# Patient Record
Sex: FEMALE | Race: ASIAN | NOT HISPANIC OR LATINO
[De-identification: names, ages, dates, MRNs, and addresses within clinical notes are randomized per-mention and may not be internally consistent; named-entity substitution may affect disease eponyms.]

---

## 2018-02-19 ENCOUNTER — FORM ENCOUNTER (OUTPATIENT)
Age: 68
End: 2018-02-19

## 2018-06-01 ENCOUNTER — FORM ENCOUNTER (OUTPATIENT)
Age: 68
End: 2018-06-01

## 2020-05-12 ENCOUNTER — FORM ENCOUNTER (OUTPATIENT)
Age: 70
End: 2020-05-12

## 2021-03-12 ENCOUNTER — APPOINTMENT (OUTPATIENT)
Dept: HEMATOLOGY ONCOLOGY | Facility: CLINIC | Age: 71
End: 2021-03-12

## 2021-03-12 PROBLEM — Z00.00 ENCOUNTER FOR PREVENTIVE HEALTH EXAMINATION: Status: ACTIVE | Noted: 2021-03-12

## 2021-04-05 ENCOUNTER — NON-APPOINTMENT (OUTPATIENT)
Age: 71
End: 2021-04-05

## 2021-04-05 ENCOUNTER — APPOINTMENT (OUTPATIENT)
Dept: CV DIAGNOSITCS | Facility: HOSPITAL | Age: 71
End: 2021-04-05

## 2021-04-05 ENCOUNTER — OUTPATIENT (OUTPATIENT)
Dept: OUTPATIENT SERVICES | Facility: HOSPITAL | Age: 71
LOS: 1 days | End: 2021-04-05
Payer: MEDICARE

## 2021-04-05 ENCOUNTER — APPOINTMENT (OUTPATIENT)
Dept: CARDIOLOGY | Facility: CLINIC | Age: 71
End: 2021-04-05
Payer: MEDICARE

## 2021-04-05 VITALS
HEIGHT: 59 IN | DIASTOLIC BLOOD PRESSURE: 66 MMHG | SYSTOLIC BLOOD PRESSURE: 144 MMHG | HEART RATE: 60 BPM | OXYGEN SATURATION: 97 % | RESPIRATION RATE: 16 BRPM | TEMPERATURE: 97 F | BODY MASS INDEX: 31.11 KG/M2 | WEIGHT: 154.32 LBS

## 2021-04-05 DIAGNOSIS — R94.31 ABNORMAL ELECTROCARDIOGRAM [ECG] [EKG]: ICD-10-CM

## 2021-04-05 PROCEDURE — 93306 TTE W/DOPPLER COMPLETE: CPT | Mod: 26

## 2021-04-05 PROCEDURE — 93000 ELECTROCARDIOGRAM COMPLETE: CPT

## 2021-04-05 PROCEDURE — 99204 OFFICE O/P NEW MOD 45 MIN: CPT

## 2021-04-05 RX ORDER — FOLIC ACID 1 MG/1
1 TABLET ORAL
Qty: 90 | Refills: 0 | Status: ACTIVE | COMMUNITY
Start: 2020-04-08

## 2021-04-05 RX ORDER — GABAPENTIN 100 MG/1
100 CAPSULE ORAL
Qty: 90 | Refills: 0 | Status: ACTIVE | COMMUNITY
Start: 2020-12-23

## 2021-04-05 RX ORDER — CALCIUM ACETATE 667 MG/1
667 TABLET ORAL
Qty: 270 | Refills: 0 | Status: ACTIVE | COMMUNITY
Start: 2020-07-17

## 2021-04-05 NOTE — REASON FOR VISIT
[Initial Evaluation] : an initial evaluation of [Spouse] : spouse [FreeTextEntry1] : carcinoid syndrome

## 2021-04-05 NOTE — PHYSICAL EXAM
[Well Groomed] : well groomed [General Appearance - Well Nourished] : well nourished [General Appearance - In No Acute Distress] : no acute distress [Normal Conjunctiva] : the conjunctiva exhibited no abnormalities [No Oral Pallor] : no oral pallor [No Oral Cyanosis] : no oral cyanosis [Normal Jugular Venous V Waves Present] : normal jugular venous V waves present [Heart Rate And Rhythm] : heart rate and rhythm were normal [Heart Sounds] : normal S1 and S2 [Murmurs] : no murmurs present [Arterial Pulses Normal] : the arterial pulses were normal [Edema] : no peripheral edema present [Respiration, Rhythm And Depth] : normal respiratory rhythm and effort [Exaggerated Use Of Accessory Muscles For Inspiration] : no accessory muscle use [Auscultation Breath Sounds / Voice Sounds] : lungs were clear to auscultation bilaterally [Abdomen Soft] : soft [Abdomen Tenderness] : non-tender [Nail Clubbing] : no clubbing of the fingernails [Cyanosis, Localized] : no localized cyanosis [Skin Turgor] : normal skin turgor [] : no rash [Mood] : the mood was normal [No Anxiety] : not feeling anxious [FreeTextEntry1] : seated in wheelchair

## 2021-04-05 NOTE — HISTORY OF PRESENT ILLNESS
[FreeTextEntry1] : 71 year old woman with pulmonary carcinoid tumor s/p wedge resection, ESRD on HD via LUE AV fistula, CVA with residual left hemiparesis, hypertension, syncope s/p dual chamber pacemaker, and insulin dependent diabetes who is wheelchair dependent at baseline referred for cardiovascular evaluation because of carcinoid syndrome. She reports dyspnea and more labored breathing prior to hemodialysis which is stable and unchanged for several years. She denies any chest pain, palpitations, abdominal distension, lower extremity edema, dizziness/lightheadedness. Carcinoid symptoms are under good control on monthly somatostatin therapy.\par \par She is not aware of any history of cardiovascular disease and reports a cardiac catheterization done at [facility] in 11/2019 showed no obstructive disease. Her last echocardiogram in 2020 showed normal LV systolic function; tricuspid valve was not well visualized on that study. Most recent chromogranin A  165 (stable).\par \par   \par Current Medications:\par clopidogrel\par amlodipine\par hydralazine 100 mg TID\par carvedilol 6.25 BID\par calcium acetate\par atorvastatin 20\par insulin\par \par EKG 4/5/2021: atrial pacemaker, RBBB.\par

## 2021-04-05 NOTE — ASSESSMENT
[FreeTextEntry1] : In summary, this is a 71 year old woman with history of stroke with residual hemiparesis, ESRD on HD with an upper extremity fistula, permanent pacemaker for syncope, and CAD risk factors seen for evaluation and screening for carcinoid heart disease. Cardiac symptom status is difficult to assess, but appears stable. Carcinoid syndrome appears well controlled. She has no evidence of significant valvulopathy or right sided heart failure.\par \par Blood pressure is elevated on assessment today, but she is due for dialysis tomorrow so this may reflect systemic volume status. She is on medical therapy for hypertension.\par \par Her pacemaker is followed by her cardiologist at [facility].\par \par A transthoracic echocardiogram will be obtained today to evaluate the heart valves and function. Cardiac MRI is preferred modality, but will be challenging in the setting of chronic renal failure and permanent pacemaker. If visualization is suboptimal on transthoracic images, a transesophageal echocardiogram may be considered. I discussed this with the patient today. However, in the absence of suspicious abnormalities on echo or new symptoms, we will continue ongoing follow up with annual transthoracic echocardiogram.\par \par

## 2021-04-09 ENCOUNTER — NON-APPOINTMENT (OUTPATIENT)
Age: 71
End: 2021-04-09

## 2022-04-12 ENCOUNTER — FORM ENCOUNTER (OUTPATIENT)
Age: 72
End: 2022-04-12

## 2022-04-25 ENCOUNTER — APPOINTMENT (OUTPATIENT)
Dept: CARDIOLOGY | Facility: CLINIC | Age: 72
End: 2022-04-25
Payer: MEDICARE

## 2022-04-25 ENCOUNTER — RESULT REVIEW (OUTPATIENT)
Age: 72
End: 2022-04-25

## 2022-04-25 ENCOUNTER — OUTPATIENT (OUTPATIENT)
Dept: OUTPATIENT SERVICES | Facility: HOSPITAL | Age: 72
LOS: 1 days | Discharge: HOME | End: 2022-04-25
Payer: MEDICARE

## 2022-04-25 VITALS
TEMPERATURE: 96.8 F | DIASTOLIC BLOOD PRESSURE: 68 MMHG | OXYGEN SATURATION: 99 % | HEART RATE: 66 BPM | HEIGHT: 59 IN | RESPIRATION RATE: 16 BRPM | WEIGHT: 154.65 LBS | SYSTOLIC BLOOD PRESSURE: 200 MMHG | BODY MASS INDEX: 31.18 KG/M2

## 2022-04-25 DIAGNOSIS — D64.9 ANEMIA, UNSPECIFIED: ICD-10-CM

## 2022-04-25 LAB
BASOPHILS # BLD AUTO: 0.04 K/UL — SIGNIFICANT CHANGE UP (ref 0–0.2)
BASOPHILS NFR BLD AUTO: 0.6 % — SIGNIFICANT CHANGE UP (ref 0–2)
EOSINOPHIL # BLD AUTO: 0.29 K/UL — SIGNIFICANT CHANGE UP (ref 0–0.5)
EOSINOPHIL NFR BLD AUTO: 4.7 % — SIGNIFICANT CHANGE UP (ref 0–6)
HCT VFR BLD CALC: 35 % — SIGNIFICANT CHANGE UP (ref 34.5–45)
HGB BLD-MCNC: 10.9 G/DL — LOW (ref 11.5–15.5)
IMM GRANULOCYTES NFR BLD AUTO: 0.6 % — SIGNIFICANT CHANGE UP (ref 0–1.5)
LYMPHOCYTES # BLD AUTO: 1.14 K/UL — SIGNIFICANT CHANGE UP (ref 1–3.3)
LYMPHOCYTES # BLD AUTO: 18.5 % — SIGNIFICANT CHANGE UP (ref 13–44)
MCHC RBC-ENTMCNC: 28.3 PG — SIGNIFICANT CHANGE UP (ref 27–34)
MCHC RBC-ENTMCNC: 31.1 G/DL — LOW (ref 32–36)
MCV RBC AUTO: 90.9 FL — SIGNIFICANT CHANGE UP (ref 80–100)
MONOCYTES # BLD AUTO: 0.38 K/UL — SIGNIFICANT CHANGE UP (ref 0–0.9)
MONOCYTES NFR BLD AUTO: 6.2 % — SIGNIFICANT CHANGE UP (ref 2–14)
NEUTROPHILS # BLD AUTO: 4.27 K/UL — SIGNIFICANT CHANGE UP (ref 1.8–7.4)
NEUTROPHILS NFR BLD AUTO: 69.4 % — SIGNIFICANT CHANGE UP (ref 43–77)
NRBC # BLD: 0 /100 WBCS — SIGNIFICANT CHANGE UP (ref 0–0)
PLATELET # BLD AUTO: 121 K/UL — LOW (ref 150–400)
RBC # BLD: 3.85 M/UL — SIGNIFICANT CHANGE UP (ref 3.8–5.2)
RBC # FLD: 14.3 % — SIGNIFICANT CHANGE UP (ref 10.3–14.5)
WBC # BLD: 6.16 K/UL — SIGNIFICANT CHANGE UP (ref 3.8–10.5)
WBC # FLD AUTO: 6.16 K/UL — SIGNIFICANT CHANGE UP (ref 3.8–10.5)

## 2022-04-25 PROCEDURE — 93000 ELECTROCARDIOGRAM COMPLETE: CPT

## 2022-04-25 PROCEDURE — 93010 ELECTROCARDIOGRAM REPORT: CPT

## 2022-04-25 PROCEDURE — 99215 OFFICE O/P EST HI 40 MIN: CPT

## 2022-04-25 NOTE — CARDIOLOGY SUMMARY
[de-identified] : 4/25/2022: NSR 64 bpm 1st degree AV block, RBBB\par 4/5/2021: atrial pacemaker, RBBB. [de-identified] : 4/5/2021: LVEF 66%, GLS -15.5, normal tricuspid valve, normal pulmonic valve, moderate pulmonary HTN

## 2022-04-25 NOTE — PHYSICAL EXAM
[Well Developed] : well developed [Well Nourished] : well nourished [No Acute Distress] : no acute distress [Normal Conjunctiva] : normal conjunctiva [No Xanthelasma] : no xanthelasma [No Carotid Bruit] : no carotid bruit [Normal S1, S2] : normal S1, S2 [No Murmur] : no murmur [No Rub] : no rub [No Gallop] : no gallop [Clear Lung Fields] : clear lung fields [Good Air Entry] : good air entry [No Respiratory Distress] : no respiratory distress  [Soft] : abdomen soft [Abnormal Gait] : abnormal gait [No Cyanosis] : no cyanosis [No Clubbing] : no clubbing [No Varicosities] : no varicosities [No Rash] : no rash [Normal Speech] : normal speech [Alert and Oriented] : alert and oriented [de-identified] : trace pedal edema [de-identified] : left hemiparesis

## 2022-04-25 NOTE — ASSESSMENT
[FreeTextEntry1] : 72 year old woman with history of stroke with residual hemiparesis, ESRD on HD with an upper extremity fistula, permanent pacemaker for syncope, and CAD risk factors seen for evaluation and screening for carcinoid heart disease. Carcinoid syndrome appears well controlled. She has no evidence of significant valvulopathy or right sided heart failure.\par \par Blood pressure is quite elevated on assessment today, but she is due for dialysis tomorrow so this may reflect systemic volume status. She is on medical therapy for hypertension.\par - advised to take hydralazine TID as directed, especially on non-HD days\par - will discuss whether additional fluid removal during dialysis may be helpful for shortness of breath\par \par Her pacemaker is followed by her cardiologist at [facility].\par \par Shortness of breath: discussed whether additional fluid removal with HD may be helpful. Due to back pain, she is sometimes unable to tolerate full session.\par - nuclear stress test given risk factors\par - transthoracic echo\par \par Neuroendocrine tumor: lung involvement but no significant hepatic disease\par - will repeat transthoracic echo\par - mild aortic valve thickening noted previously, will repeat and consider TRACY pending ongoing dyspnea after ischemic eval and optimization of BP and HD\par - chromogranin A, BNP, HsTnt today\par \par Follow up in 3 months with me\par \par Total time spent on today's encounter was 45 minutes. >50% time spent in counseling and coordination of care and on addressing above medical conditions in assessment.\par \par \par  \par \par

## 2022-04-25 NOTE — REVIEW OF SYSTEMS
[SOB] : shortness of breath [Limb Weakness (Paresis)] : limb weakness (Paresis) [Negative] : Heme/Lymph

## 2022-04-25 NOTE — HISTORY OF PRESENT ILLNESS
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 No chest pain, pressure, or palpitations. Remains on monthly octreotide with stable disease. No carcinoid symptoms.\par \par \par \par History: \par She reports dyspnea and more labored breathing prior to hemodialysis which is stable and unchanged for several years. She denies any chest pain, palpitations, abdominal distension, lower extremity edema, dizziness/lightheadedness. Carcinoid symptoms are under good control on monthly somatostatin therapy.\par \par She is not aware of any history of cardiovascular disease and reports a cardiac catheterization done at [facility] in 11/2019 showed no obstructive disease. Her last echocardiogram in 2020 showed normal LV systolic function; tricuspid valve was not well visualized on that study. Most recent chromogranin A  165 (stable).\par \par   \par Current Medications:\par clopidogrel\par amlodipine\par hydralazine 100 mg TID\par carvedilol 6.25 BID\par calcium acetate\par atorvastatin 20\par insulin\par \par \par

## 2022-04-26 LAB
CHOLEST SERPL-MCNC: 122 MG/DL
ESTIMATED AVERAGE GLUCOSE: 189 MG/DL
HBA1C MFR BLD HPLC: 8.2 %
HDLC SERPL-MCNC: 36 MG/DL
LDLC SERPL CALC-MCNC: 50 MG/DL
NONHDLC SERPL-MCNC: 85 MG/DL
NT-PROBNP SERPL-MCNC: 2364 PG/ML
TRIGL SERPL-MCNC: 177 MG/DL
TROPONIN-T, HIGH SENSITIVITY: 83 NG/L

## 2022-04-29 LAB — CGA SERPL-MCNC: 94.1 NG/ML

## 2022-05-02 ENCOUNTER — APPOINTMENT (OUTPATIENT)
Dept: CV DIAGNOSTICS | Facility: HOSPITAL | Age: 72
End: 2022-05-02
Payer: MEDICARE

## 2022-05-02 ENCOUNTER — OUTPATIENT (OUTPATIENT)
Dept: OUTPATIENT SERVICES | Facility: HOSPITAL | Age: 72
LOS: 1 days | End: 2022-05-02

## 2022-05-02 DIAGNOSIS — N18.6 END STAGE RENAL DISEASE: ICD-10-CM

## 2022-05-02 DIAGNOSIS — R06.02 SHORTNESS OF BREATH: ICD-10-CM

## 2022-05-02 DIAGNOSIS — I10 ESSENTIAL (PRIMARY) HYPERTENSION: ICD-10-CM

## 2022-05-02 PROCEDURE — 78452 HT MUSCLE IMAGE SPECT MULT: CPT | Mod: 26,MH

## 2022-05-02 PROCEDURE — 93018 CV STRESS TEST I&R ONLY: CPT | Mod: GC

## 2022-05-02 PROCEDURE — 93016 CV STRESS TEST SUPVJ ONLY: CPT | Mod: GC

## 2022-05-04 ENCOUNTER — APPOINTMENT (OUTPATIENT)
Dept: CV DIAGNOSITCS | Facility: HOSPITAL | Age: 72
End: 2022-05-04

## 2022-05-04 ENCOUNTER — OUTPATIENT (OUTPATIENT)
Dept: OUTPATIENT SERVICES | Facility: HOSPITAL | Age: 72
LOS: 1 days | End: 2022-05-04
Payer: MEDICARE

## 2022-05-04 DIAGNOSIS — C7A.090 MALIGNANT CARCINOID TUMOR OF THE BRONCHUS AND LUNG: ICD-10-CM

## 2022-05-04 DIAGNOSIS — R06.02 SHORTNESS OF BREATH: ICD-10-CM

## 2022-05-04 PROCEDURE — 93306 TTE W/DOPPLER COMPLETE: CPT | Mod: 26

## 2022-05-09 ENCOUNTER — NON-APPOINTMENT (OUTPATIENT)
Age: 72
End: 2022-05-09

## 2022-05-09 ENCOUNTER — OUTPATIENT (OUTPATIENT)
Dept: OUTPATIENT SERVICES | Facility: HOSPITAL | Age: 72
LOS: 1 days | End: 2022-05-09
Payer: MEDICARE

## 2022-05-09 DIAGNOSIS — Z11.52 ENCOUNTER FOR SCREENING FOR COVID-19: ICD-10-CM

## 2022-05-09 LAB — SARS-COV-2 RNA SPEC QL NAA+PROBE: SIGNIFICANT CHANGE UP

## 2022-05-09 PROCEDURE — U0003: CPT

## 2022-05-09 PROCEDURE — U0005: CPT

## 2022-05-09 PROCEDURE — C9803: CPT

## 2022-05-11 ENCOUNTER — OUTPATIENT (OUTPATIENT)
Dept: OUTPATIENT SERVICES | Facility: HOSPITAL | Age: 72
LOS: 1 days | End: 2022-05-11
Payer: MEDICARE

## 2022-05-11 ENCOUNTER — TRANSCRIPTION ENCOUNTER (OUTPATIENT)
Age: 72
End: 2022-05-11

## 2022-05-11 VITALS
RESPIRATION RATE: 17 BRPM | OXYGEN SATURATION: 97 % | HEART RATE: 77 BPM | SYSTOLIC BLOOD PRESSURE: 162 MMHG | DIASTOLIC BLOOD PRESSURE: 75 MMHG

## 2022-05-11 VITALS
WEIGHT: 154.32 LBS | DIASTOLIC BLOOD PRESSURE: 87 MMHG | RESPIRATION RATE: 18 BRPM | HEART RATE: 78 BPM | TEMPERATURE: 98 F | OXYGEN SATURATION: 98 % | HEIGHT: 59 IN | SYSTOLIC BLOOD PRESSURE: 193 MMHG

## 2022-05-11 DIAGNOSIS — R06.00 DYSPNEA, UNSPECIFIED: ICD-10-CM

## 2022-05-11 DIAGNOSIS — I77.0 ARTERIOVENOUS FISTULA, ACQUIRED: Chronic | ICD-10-CM

## 2022-05-11 DIAGNOSIS — Z95.0 PRESENCE OF CARDIAC PACEMAKER: Chronic | ICD-10-CM

## 2022-05-11 DIAGNOSIS — L98.8 OTHER SPECIFIED DISORDERS OF THE SKIN AND SUBCUTANEOUS TISSUE: Chronic | ICD-10-CM

## 2022-05-11 LAB
ANION GAP SERPL CALC-SCNC: 15 MMOL/L — SIGNIFICANT CHANGE UP (ref 5–17)
BUN SERPL-MCNC: 24 MG/DL — HIGH (ref 7–23)
CALCIUM SERPL-MCNC: 9.6 MG/DL — SIGNIFICANT CHANGE UP (ref 8.4–10.5)
CHLORIDE SERPL-SCNC: 100 MMOL/L — SIGNIFICANT CHANGE UP (ref 96–108)
CO2 SERPL-SCNC: 27 MMOL/L — SIGNIFICANT CHANGE UP (ref 22–31)
CREAT SERPL-MCNC: 4.52 MG/DL — HIGH (ref 0.5–1.3)
EGFR: 10 ML/MIN/1.73M2 — LOW
GLUCOSE BLDC GLUCOMTR-MCNC: 218 MG/DL — HIGH (ref 70–99)
GLUCOSE SERPL-MCNC: 217 MG/DL — HIGH (ref 70–99)
HCT VFR BLD CALC: 33.8 % — LOW (ref 34.5–45)
HGB BLD-MCNC: 10.4 G/DL — LOW (ref 11.5–15.5)
MCHC RBC-ENTMCNC: 28.2 PG — SIGNIFICANT CHANGE UP (ref 27–34)
MCHC RBC-ENTMCNC: 30.8 GM/DL — LOW (ref 32–36)
MCV RBC AUTO: 91.6 FL — SIGNIFICANT CHANGE UP (ref 80–100)
NRBC # BLD: 0 /100 WBCS — SIGNIFICANT CHANGE UP (ref 0–0)
PLATELET # BLD AUTO: 150 K/UL — SIGNIFICANT CHANGE UP (ref 150–400)
POTASSIUM SERPL-MCNC: 4.5 MMOL/L — SIGNIFICANT CHANGE UP (ref 3.5–5.3)
POTASSIUM SERPL-SCNC: 4.5 MMOL/L — SIGNIFICANT CHANGE UP (ref 3.5–5.3)
RBC # BLD: 3.69 M/UL — LOW (ref 3.8–5.2)
RBC # FLD: 14.2 % — SIGNIFICANT CHANGE UP (ref 10.3–14.5)
SODIUM SERPL-SCNC: 142 MMOL/L — SIGNIFICANT CHANGE UP (ref 135–145)
WBC # BLD: 6.09 K/UL — SIGNIFICANT CHANGE UP (ref 3.8–10.5)
WBC # FLD AUTO: 6.09 K/UL — SIGNIFICANT CHANGE UP (ref 3.8–10.5)

## 2022-05-11 PROCEDURE — 80048 BASIC METABOLIC PNL TOTAL CA: CPT

## 2022-05-11 PROCEDURE — 93454 CORONARY ARTERY ANGIO S&I: CPT

## 2022-05-11 PROCEDURE — 82962 GLUCOSE BLOOD TEST: CPT

## 2022-05-11 PROCEDURE — 93454 CORONARY ARTERY ANGIO S&I: CPT | Mod: 26

## 2022-05-11 PROCEDURE — 93010 ELECTROCARDIOGRAM REPORT: CPT

## 2022-05-11 PROCEDURE — C1894: CPT

## 2022-05-11 PROCEDURE — C1769: CPT

## 2022-05-11 PROCEDURE — 93005 ELECTROCARDIOGRAM TRACING: CPT

## 2022-05-11 PROCEDURE — 85027 COMPLETE CBC AUTOMATED: CPT

## 2022-05-11 RX ORDER — CALCIUM ACETATE 667 MG
2 TABLET ORAL
Qty: 0 | Refills: 0 | DISCHARGE

## 2022-05-11 RX ORDER — INSULIN DETEMIR 100/ML (3)
20 INSULIN PEN (ML) SUBCUTANEOUS
Qty: 0 | Refills: 0 | DISCHARGE

## 2022-05-11 RX ORDER — CARVEDILOL PHOSPHATE 80 MG/1
1 CAPSULE, EXTENDED RELEASE ORAL
Qty: 0 | Refills: 0 | DISCHARGE

## 2022-05-11 RX ORDER — SODIUM CHLORIDE 9 MG/ML
3 INJECTION INTRAMUSCULAR; INTRAVENOUS; SUBCUTANEOUS EVERY 8 HOURS
Refills: 0 | Status: DISCONTINUED | OUTPATIENT
Start: 2022-05-11 | End: 2022-05-25

## 2022-05-11 RX ORDER — DIPHENHYDRAMINE HCL 50 MG
50 CAPSULE ORAL ONCE
Refills: 0 | Status: COMPLETED | OUTPATIENT
Start: 2022-05-11 | End: 2022-05-11

## 2022-05-11 RX ORDER — CLOPIDOGREL BISULFATE 75 MG/1
1 TABLET, FILM COATED ORAL
Qty: 0 | Refills: 0 | DISCHARGE

## 2022-05-11 RX ORDER — GABAPENTIN 400 MG/1
0 CAPSULE ORAL
Qty: 0 | Refills: 0 | DISCHARGE

## 2022-05-11 RX ORDER — ATORVASTATIN CALCIUM 80 MG/1
1 TABLET, FILM COATED ORAL
Qty: 0 | Refills: 0 | DISCHARGE

## 2022-05-11 RX ORDER — AMLODIPINE BESYLATE 2.5 MG/1
1 TABLET ORAL
Qty: 0 | Refills: 0 | DISCHARGE

## 2022-05-11 RX ORDER — HYDRALAZINE HCL 50 MG
1 TABLET ORAL
Qty: 0 | Refills: 0 | DISCHARGE

## 2022-05-11 RX ORDER — INSULIN ASPART 100 [IU]/ML
8 INJECTION, SOLUTION SUBCUTANEOUS
Qty: 0 | Refills: 0 | DISCHARGE

## 2022-05-11 RX ADMIN — Medication 50 MILLIGRAM(S): at 08:29

## 2022-05-11 NOTE — H&P CARDIOLOGY - NSICDXPASTMEDICALHX_GEN_ALL_CORE_FT
PAST MEDICAL HISTORY:  Carcinoid heart disease     CVA (cerebral vascular accident) right sided hemiparesis    Diabetes mellitus, type 2     ESRD (end stage renal disease) on dialysis     HLD (hyperlipidemia)     HTN (hypertension), benign     Syncope and collapse

## 2022-05-11 NOTE — ASU DISCHARGE PLAN (ADULT/PEDIATRIC) - ASU DC SPECIAL INSTRUCTIONSFT
Wound Care:   the day AFTER your procedure remove bandage GENTLY, and clean using  mild soap and gentle warm, water stream, pat dry. leave OPEN to air. YOU MAY SHOWER   DO NOT apply lotions, creams, ointments, powder, perfumes to your incision site  DO NOT SOAK your site for 1 week ( no baths, no pools, no tubs, etc...)  Check  your groin and /or wrist daily. A small amount of bruising, and soreness are normal    ACTIVITY: for 24 hours   - DO NOT DRIVE  - DO NOT make any important decisions or sign legal documents   - DO NOT operate heavy machineries   - you may resume sexual activity in 48 hours, unless otherwise instructed by your cardiologist     If your procedure was done through the WRIST: for the NEXT 3DAYS:  - avoid pushing, pulling, with that affected wrist   - avoid repeated movement of that hand and wrist ( e.g: typing, hammering)  - DO NOT LIFT anything more than 5 lbs     If your procedure was done through the GROIN: for the NEXT 5 DAYS  - Limit climbing stairs, DO NOT soak in bathtub or pool  - no strenuous activities, pushing, pulling, straining  - Do not lift anything 10lbs or heavier     MEDICATION:   take your medications as explained ( see discharge paperwork)   If you received a STENT, you will be taking antiplatelet medications to KEEP YOUR STENT OPEN ( e.g: Aspirin, Plavix, Brilinta, Effient, etc).  Take as prescribed DO NOT STOP taking them without consulting with your cardiologist first.     Follow heart healthy diet recommended by your doctor, , if you smoke STOP SMOKING ( may call [phone] for center of tobacco control if you need assistance)     CALL your doctor to make appointment in 2 WEEKS     ***CALL YOUR DOCTOR***  if you experience: fever, chills, body aches, or severe pain, swelling, redness, heat or yellow discharge at incision site  If you experience Bleeding or excruciating pain at the procedural site, swelling ( golf ball size) at your procedural site  If you experience CHEST PAIN  If you experience extremity numbness, tingling, temperature change ( of your procedural site)   If you are unable to reach your doctor, you may contact:   -Cardiology Office at [facility] at [phone] or   - [facility] [phone]  - [facility] [phone]

## 2022-05-11 NOTE — ASU DISCHARGE PLAN (ADULT/PEDIATRIC) - NS MD DC FALL RISK RISK
For information on Fall & Injury Prevention, visit: [email or web address] OR  [email or web address] OR  https://www.cdc.gov/steadi/patient.html

## 2022-05-11 NOTE — H&P CARDIOLOGY - HISTORY OF PRESENT ILLNESS
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 Carcinoid symptoms are under good control on monthly somatostatin therapy. She reports dyspnea and more labored breathing prior to hemodialysis which is stable and unchanged for several years. She denies any chest pain, palpitations, abdominal distension, lower extremity edema, dizziness/lightheadedness. Echo: 5/4/2022: LVEF 65%, GLS -15.5, normal tricuspid valve, normal pulmonic valve, moderate pulmonary HTN minimal MR.  stress test on 5/2022 demonstrates LV size is normal, there is a medium sized moderate deffect in the inferolateral wall that is reversible suggestive of ischemia. Seen & evaluated by cardiologist Dr Carlos Mcdonald   .   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 Carcinoid symptoms are under good control on monthly somatostatin therapy. She reports dyspnea and more labored breathing prior to hemodialysis which is stable and unchanged for several years. She denies any chest pain, palpitations, abdominal distension, lower extremity edema, dizziness/lightheadedness. Echo: 5/4/2022: LVEF 65%, GLS -15.5, normal tricuspid valve, normal pulmonic valve, moderate pulmonary HTN minimal MR.  stress test on 5/2022 demonstrates LV size is normal, there is a medium sized moderate deffect in the inferolateral wall that is reversible suggestive of ischemia. Seen & evaluated by cardiologist Dr Carlos Mcdonald     Renal Dr Dougherty  HD @ [facility] in [facility]  pt allergic to IV fluroscene pt to receive IV benadryl 50mg IVP Dr Rand notified.

## 2022-05-11 NOTE — ASU DISCHARGE PLAN (ADULT/PEDIATRIC) - CARE PROVIDER_API CALL
Salomon Martinez)  Cardiovascular Disease; Internal Medicine  [id number] [place] [address], [address] [id number]  Phone: [phone]  Fax: [phone]  Follow Up Time:

## 2022-08-10 PROBLEM — I10 ESSENTIAL (PRIMARY) HYPERTENSION: Chronic | Status: ACTIVE | Noted: 2022-05-11

## 2022-08-10 PROBLEM — R55 SYNCOPE AND COLLAPSE: Chronic | Status: ACTIVE | Noted: 2022-05-11

## 2022-08-10 PROBLEM — N18.6 END STAGE RENAL DISEASE: Chronic | Status: ACTIVE | Noted: 2022-05-11

## 2022-08-10 PROBLEM — I51.89 OTHER ILL-DEFINED HEART DISEASES: Chronic | Status: ACTIVE | Noted: 2022-05-11

## 2022-08-10 PROBLEM — E11.9 TYPE 2 DIABETES MELLITUS WITHOUT COMPLICATIONS: Chronic | Status: ACTIVE | Noted: 2022-05-11

## 2022-08-10 PROBLEM — E78.5 HYPERLIPIDEMIA, UNSPECIFIED: Chronic | Status: ACTIVE | Noted: 2022-05-11

## 2022-08-10 PROBLEM — I63.9 CEREBRAL INFARCTION, UNSPECIFIED: Chronic | Status: ACTIVE | Noted: 2022-05-11

## 2022-08-12 DIAGNOSIS — Z83.3 FAMILY HISTORY OF DIABETES MELLITUS: ICD-10-CM

## 2022-08-12 DIAGNOSIS — Z82.49 FAMILY HISTORY OF ISCHEMIC HEART DISEASE AND OTHER DISEASES OF THE CIRCULATORY SYSTEM: ICD-10-CM

## 2022-08-12 DIAGNOSIS — Z82.61 FAMILY HISTORY OF ARTHRITIS: ICD-10-CM

## 2022-08-12 DIAGNOSIS — B35.1 TINEA UNGUIUM: ICD-10-CM

## 2022-08-12 DIAGNOSIS — I63.9 CEREBRAL INFARCTION, UNSPECIFIED: ICD-10-CM

## 2022-08-12 DIAGNOSIS — Z98.890 OTHER SPECIFIED POSTPROCEDURAL STATES: ICD-10-CM

## 2022-08-24 ENCOUNTER — APPOINTMENT (OUTPATIENT)
Dept: PODIATRY | Facility: CLINIC | Age: 72
End: 2022-08-24

## 2022-08-24 VITALS — HEIGHT: 59 IN | BODY MASS INDEX: 31.04 KG/M2 | WEIGHT: 154 LBS

## 2022-08-24 DIAGNOSIS — G62.9 POLYNEUROPATHY, UNSPECIFIED: ICD-10-CM

## 2022-08-24 PROCEDURE — 99212 OFFICE O/P EST SF 10 MIN: CPT

## 2022-08-29 ENCOUNTER — APPOINTMENT (OUTPATIENT)
Dept: CARDIOLOGY | Facility: CLINIC | Age: 72
End: 2022-08-29

## 2022-08-29 ENCOUNTER — OUTPATIENT (OUTPATIENT)
Dept: OUTPATIENT SERVICES | Facility: HOSPITAL | Age: 72
LOS: 1 days | Discharge: HOME | End: 2022-08-29

## 2022-08-29 VITALS
HEART RATE: 68 BPM | BODY MASS INDEX: 30.28 KG/M2 | TEMPERATURE: 97.6 F | SYSTOLIC BLOOD PRESSURE: 158 MMHG | WEIGHT: 149.91 LBS | RESPIRATION RATE: 16 BRPM | DIASTOLIC BLOOD PRESSURE: 73 MMHG | OXYGEN SATURATION: 99 %

## 2022-08-29 DIAGNOSIS — L98.8 OTHER SPECIFIED DISORDERS OF THE SKIN AND SUBCUTANEOUS TISSUE: Chronic | ICD-10-CM

## 2022-08-29 DIAGNOSIS — I77.0 ARTERIOVENOUS FISTULA, ACQUIRED: Chronic | ICD-10-CM

## 2022-08-29 DIAGNOSIS — Z95.0 PRESENCE OF CARDIAC PACEMAKER: Chronic | ICD-10-CM

## 2022-08-29 DIAGNOSIS — D64.9 ANEMIA, UNSPECIFIED: ICD-10-CM

## 2022-08-29 PROBLEM — G62.9 PERIPHERAL NEUROPATHY: Status: ACTIVE | Noted: 2022-08-29

## 2022-08-29 PROCEDURE — 99215 OFFICE O/P EST HI 40 MIN: CPT

## 2022-08-29 PROCEDURE — 93010 ELECTROCARDIOGRAM REPORT: CPT

## 2022-08-29 PROCEDURE — 93000 ELECTROCARDIOGRAM COMPLETE: CPT

## 2022-08-29 RX ORDER — FUROSEMIDE 20 MG/1
20 TABLET ORAL
Refills: 0 | Status: DISCONTINUED | COMMUNITY
End: 2022-08-29

## 2022-08-29 RX ORDER — AMLODIPINE BESYLATE 10 MG/1
10 TABLET ORAL
Refills: 0 | Status: COMPLETED | COMMUNITY
End: 2022-08-29

## 2022-08-29 NOTE — ASSESSMENT
[FreeTextEntry1] : 72 year old woman with history of stroke with residual hemiparesis, ESRD on HD with an upper extremity fistula, permanent pacemaker for syncope, CAD risk factors, carcinoid tumor. She has no evidence of significant valvulopathy or right sided heart failure.\par \par Blood pressure is again elevated on assessment today, but she reports it is at times lower after HD. She is on medical therapy for hypertension.\par - advised to take hydralazine TID as directed, especially on non-HD days\par - additional fluid removal during dialysis may be helpful for shortness of breath\par - resume amlodipine 10 mg daily\par - continue carvedilol 6.25 BID\par \par Her pacemaker is followed by her cardiologist at [facility].\par \par Non-obstructive CAD\par - continue atorvastatin 20\par - was intolerant to aspirin in the past\par - optimize ASCVD risk factors\par \par Neuroendocrine tumor: lung involvement but no significant hepatic disease\par - mild aortic valve thickening noted previously; normal TV, mild-moderate TR (likely from pacing wire)\par - biomarkers checked 4/2022 - stable\par - will repeat echo in 6 months, possible TRACY\par - continue somatostatin analog\par \par \par Above recommendations discussed with the patient and her  and all questions were answered to the best of my ability and to her apparent satisfaction.\par \par Follow up in 6 months with me

## 2022-08-29 NOTE — PHYSICAL EXAM
[Well Developed] : well developed [Well Nourished] : well nourished [No Acute Distress] : no acute distress [Normal Conjunctiva] : normal conjunctiva [No Xanthelasma] : no xanthelasma [No Carotid Bruit] : no carotid bruit [Normal S1, S2] : normal S1, S2 [No Murmur] : no murmur [No Rub] : no rub [No Gallop] : no gallop [Clear Lung Fields] : clear lung fields [Good Air Entry] : good air entry [No Respiratory Distress] : no respiratory distress  [Soft] : abdomen soft [Abnormal Gait] : abnormal gait [No Cyanosis] : no cyanosis [No Clubbing] : no clubbing [No Varicosities] : no varicosities [No Rash] : no rash [Normal Speech] : normal speech [Alert and Oriented] : alert and oriented [de-identified] : trace pedal edema [de-identified] : left hemiparesis

## 2022-08-29 NOTE — ASSESSMENT
[FreeTextEntry1] : \par Impression: Peripheral neuropathy. Questionable contusion.\par \par Treatment: I want her to soak with warm water and Epsom salt. Her shoes are orthopedic and adequate. she needs to inspect the foot. She takes Gabapentin for the pain. Will follow-up in the office for evaluation as needed.

## 2022-08-29 NOTE — HISTORY OF PRESENT ILLNESS
[Sneakers] : mayi [Wheelchair] : a wheelchair [FreeTextEntry1] : Patient presents today for evaluation. She thinks she might have banged the left toe. It was painful and now not so much. She has insulin dependent diabetes. Fasting sugar is 124. A1c is 8. She is under the care of Dr. Patricia who she last saw 2 weeks ago. She comes in a wheelchair with her family.

## 2022-08-29 NOTE — PHYSICAL EXAM
[Delayed in the Right Toes] : capillary refills delayed in the right toes [Delayed in the Left Toes] : capillary refills delayed in the left toes [0] : left foot dorsalis pedis 0 [de-identified] : Tendons are intact. good ROM. There is no sign of infection or trauma. [FreeTextEntry4] : absent [FreeTextEntry8] : absent [FreeTextEntry1] : Absent monofilament testing bilateral.

## 2022-08-29 NOTE — HISTORY OF PRESENT ILLNESS
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 Has not been taking amlodipine. Taking carvedilol and hydralazine (twice daily).\par No further chest pain and shortness of breath improved. \par Chromogranin A levels increased slightly, so back to monthly octreotide.\par \par \par History: \par She reports dyspnea and more labored breathing prior to hemodialysis which is stable and unchanged for several years. She denies any chest pain, palpitations, abdominal distension, lower extremity edema, dizziness/lightheadedness. Carcinoid symptoms are under control on monthly somatostatin therapy.\par \par She is not aware of any history of cardiovascular disease and reports a cardiac catheterization done at [facility] in 11/2019 showed no obstructive disease. Her last echocardiogram in 2020 showed normal LV systolic function; tricuspid valve was not well visualized on that study. Most recent chromogranin A 165 (stable).\par \par  April 2022:\par Reports increased shortness of breath over the past 6 months or so. No chest pain, pressure, or palpitations. Remains on monthly octreotide with stable disease. No carcinoid symptoms.\par \par Medications:\par clopidogrel\par amlodipine\par hydralazine 100 mg TID\par carvedilol 6.25 BID\par calcium acetate\par atorvastatin 20\par insulin\par \par Cardiovascular Summary:\par ----------------------------------------------\par ECG:\par 8/29/2022: NSR 68 bpm, 1st degree AV block, RBBB\par 4/25/2022: NSR 64 bpm 1st degree AV block, RBBB\par 4/5/2021: atrial pacemaker, RBBB. \par ----------------------------------------------\par Echo:\par 5/4/2022: EF 65%, mild to moderate TR. Normal appearing tricuspid valve\par 4/5/2021: LVEF 66%, GLS -15.5, normal tricuspid valve, normal pulmonic valve, moderate pulmonary HTN \par ----------------------------------------------\par Stress:\par 5/2/2022: moderate inferior ischemia\par ----------------------------------------------\par Cath:\par 5/11/2022: LM minor luminal irregularities, LAD mild athero, LCx 30 % ostial disease, RCA 20 % distal disease\par \par

## 2022-11-15 ENCOUNTER — APPOINTMENT (OUTPATIENT)
Dept: PODIATRY | Facility: CLINIC | Age: 72
End: 2022-11-15

## 2022-11-15 VITALS — HEIGHT: 59 IN | BODY MASS INDEX: 30.04 KG/M2 | WEIGHT: 149 LBS

## 2022-11-15 DIAGNOSIS — I73.9 PERIPHERAL VASCULAR DISEASE, UNSPECIFIED: ICD-10-CM

## 2022-11-15 PROCEDURE — 99212 OFFICE O/P EST SF 10 MIN: CPT

## 2022-11-15 RX ORDER — HYDROCORTISONE 1 %
12 CREAM (GRAM) TOPICAL
Qty: 1 | Refills: 0 | Status: ACTIVE | COMMUNITY
Start: 2022-11-15 | End: 1900-01-01

## 2022-11-18 PROBLEM — I73.9 PERIPHERAL VASCULAR DISEASE: Status: ACTIVE | Noted: 2022-11-16

## 2022-11-18 NOTE — PHYSICAL EXAM
[Ankle Swelling (On Exam)] : present [Ankle Swelling Bilaterally] : bilaterally  [Delayed in the Right Toes] : capillary refills delayed in the right toes [Delayed in the Left Toes] : capillary refills delayed in the left toes [0] : left foot dorsalis pedis 0 [FreeTextEntry3] : Absent hair growth. Skin warm to cool. [de-identified] : HAV with bunion, asymptomatic. [FreeTextEntry4] : absent vibratory  [FreeTextEntry8] : absent vibratory  [FreeTextEntry1] : [employer] monofilament testing absent at the hallux, 1st MPJ and heel bilateral. The patient has a class finding of Q9-One Class B and 2 Class C findings.

## 2022-11-18 NOTE — ASSESSMENT
[FreeTextEntry1] : \par Impression: Diabetic neuropathy. PVD. Xerosis.\par \par Treatment: I debrided dystrophic nails without incident. I applied lotion. I ePrescribed Ammonium Lactate. I want her to use moisturizing soap to hydrate and I gave her some exercises to work on while seated. She will follow-up with continued problem that persists.
No

## 2022-11-18 NOTE — HISTORY OF PRESENT ILLNESS
[Sneakers] : mayi [Wheelchair] : a wheelchair [FreeTextEntry1] : Patient presents today for diabetic foot care. She has very dry skin. A1c is 7.9. Fasting sugar 150. She is in a wheelchair and complains of dry skin that is annoying and itchy. She last saw Dr. Patricia a week ago.\par \par

## 2023-02-14 ENCOUNTER — APPOINTMENT (OUTPATIENT)
Dept: PODIATRY | Facility: CLINIC | Age: 73
End: 2023-02-14
Payer: MEDICARE

## 2023-02-14 PROCEDURE — 99212 OFFICE O/P EST SF 10 MIN: CPT

## 2023-02-17 NOTE — ASSESSMENT
[FreeTextEntry1] : \par Impression; Right hallux tibial ingrown nail. Diabetic neuropathy.\par \par Treatment: I prepped the area on this high risk patient. I used a small straight nail splitter and [place] blade to remove the offending spicule cleanly. I put on Neosporin. I debrided all dystrophic nails without incident. She could soak with warm water and Epsom salt over the winter months and follow-up in the office for evaluation only as needed.

## 2023-02-17 NOTE — HISTORY OF PRESENT ILLNESS
[Sneakers] : mayi [FreeTextEntry1] : Patient presents today  because of ingrown nail right tibial border that is inflamed and irritated. She is in a wheelchair. She is here for high-risk foot care because of neuropathy. She presents with family member. Her A1c is 7.5. Fasting sugar is 165. \par

## 2023-02-17 NOTE — PHYSICAL EXAM
[Ankle Swelling (On Exam)] : present [Ankle Swelling Bilaterally] : bilaterally  [Delayed in the Right Toes] : capillary refills delayed in the right toes [Delayed in the Left Toes] : capillary refills delayed in the left toes [0] : left foot dorsalis pedis 0 [FreeTextEntry3] : Absent hair growth. Skin warm to cool. [de-identified] : HAV with bunion, asymptomatic. [FreeTextEntry4] : absent vibratory  [FreeTextEntry8] : absent vibratory  [FreeTextEntry1] : [employer] monofilament testing absent at the hallux, 1st MPJ and heel bilateral. The patient has a class finding of Q9-One Class B and 2 Class C findings.

## 2023-03-07 ENCOUNTER — APPOINTMENT (OUTPATIENT)
Dept: CARDIOLOGY | Facility: CLINIC | Age: 73
End: 2023-03-07
Payer: MEDICARE

## 2023-03-07 VITALS
RESPIRATION RATE: 16 BRPM | BODY MASS INDEX: 30.67 KG/M2 | SYSTOLIC BLOOD PRESSURE: 168 MMHG | HEART RATE: 69 BPM | DIASTOLIC BLOOD PRESSURE: 66 MMHG | TEMPERATURE: 97.2 F | OXYGEN SATURATION: 96 % | HEIGHT: 59 IN | WEIGHT: 152.12 LBS

## 2023-03-07 DIAGNOSIS — Z01.810 ENCOUNTER FOR PREPROCEDURAL CARDIOVASCULAR EXAMINATION: ICD-10-CM

## 2023-03-07 PROCEDURE — 99215 OFFICE O/P EST HI 40 MIN: CPT

## 2023-03-07 PROCEDURE — 93010 ELECTROCARDIOGRAM REPORT: CPT

## 2023-03-07 PROCEDURE — 93000 ELECTROCARDIOGRAM COMPLETE: CPT

## 2023-03-07 RX ORDER — CLOPIDOGREL BISULFATE 75 MG/1
75 TABLET, FILM COATED ORAL
Refills: 0 | Status: ACTIVE | COMMUNITY
Start: 2021-03-03

## 2023-03-07 RX ORDER — INSULIN DETEMIR 100 [IU]/ML
100 INJECTION, SOLUTION SUBCUTANEOUS
Refills: 0 | Status: ACTIVE | COMMUNITY
Start: 2021-04-02

## 2023-03-07 RX ORDER — PEN NEEDLE, DIABETIC 29 G X1/2"
31G X 5 MM NEEDLE, DISPOSABLE MISCELLANEOUS
Refills: 0 | Status: ACTIVE | COMMUNITY
Start: 2021-01-11

## 2023-03-07 RX ORDER — CARVEDILOL 6.25 MG/1
6.25 TABLET, FILM COATED ORAL
Refills: 0 | Status: ACTIVE | COMMUNITY
Start: 2020-11-04

## 2023-03-07 RX ORDER — ATORVASTATIN CALCIUM 20 MG/1
20 TABLET, FILM COATED ORAL
Refills: 0 | Status: ACTIVE | COMMUNITY
Start: 2020-07-31

## 2023-03-07 RX ORDER — HYDRALAZINE HYDROCHLORIDE 10 MG/1
10 TABLET ORAL
Refills: 0 | Status: ACTIVE | COMMUNITY

## 2023-03-07 RX ORDER — INSULIN ASPART 100 [IU]/ML
100 INJECTION, SOLUTION INTRAVENOUS; SUBCUTANEOUS
Refills: 0 | Status: ACTIVE | COMMUNITY
Start: 2021-04-03

## 2023-03-07 RX ORDER — AMLODIPINE BESYLATE 10 MG/1
10 TABLET ORAL DAILY
Qty: 90 | Refills: 1 | Status: DISCONTINUED | COMMUNITY
Start: 2022-08-29 | End: 2023-03-07

## 2023-03-07 NOTE — HISTORY OF PRESENT ILLNESS
[FreeTextEntry1] : Interval History:\par BP has been low after dialysis, so is not taking hydralazine or carvedilol in PM after HD.\par No chest pain or shortness of breath.\par Is having cataract extraction on March 16th and requires cardiac clearance.\par Continues on monthly octreotide with Dr. 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 She denies any chest pain, palpitations, abdominal distension, lower extremity edema, dizziness/lightheadedness. Carcinoid symptoms are under control on monthly somatostatin therapy.\par \par She is not aware of any history of cardiovascular disease and reports a cardiac catheterization done at [facility] in 11/2019 showed no obstructive disease. Her last echocardiogram in 2020 showed normal LV systolic function; tricuspid valve was not well visualized on that study. Most recent chromogranin A 165 (stable).\par \par April 2022:\par Reports increased shortness of breath over the past 6 months or so. No chest pain, pressure, or palpitations. Remains on monthly octreotide with stable disease. No carcinoid symptoms.\par \par \par August 29, 2022:\par Feels well. Has not been taking amlodipine. Taking carvedilol and hydralazine (twice daily).\par No further chest pain and shortness of breath improved. \par Chromogranin A levels increased slightly, so back to monthly octreotide.\par \par \par Cardiovascular Summary:\par ----------------------------------------------\par ECG:\par 3/7/2023: A-paced with prolonged AV delay, underlying sinus with 1st degree AV block, RBBB.\par 8/29/2022: NSR 68 bpm, 1st degree AV block, RBBB\par 4/25/2022: NSR 64 bpm 1st degree AV block, RBBB\par 4/5/2021: atrial pacemaker, RBBB. \par ----------------------------------------------\par Echo:\par 5/4/2022: EF 65%, mild to moderate TR. Normal appearing tricuspid valve\par 4/5/2021: LVEF 66%, GLS -15.5, normal tricuspid valve, normal pulmonic valve, moderate pulmonary HTN \par ----------------------------------------------\par Stress:\par 5/2/2022: moderate inferior ischemia\par ----------------------------------------------\par Cath:\par 5/11/2022: LM minor luminal irregularities, LAD mild athero, LCx 30 % ostial disease, RCA 20 % distal disease

## 2023-03-07 NOTE — PHYSICAL EXAM
[Well Developed] : well developed [Well Nourished] : well nourished [No Acute Distress] : no acute distress [Normal Conjunctiva] : normal conjunctiva [No Xanthelasma] : no xanthelasma [No Carotid Bruit] : no carotid bruit [Normal S1, S2] : normal S1, S2 [No Murmur] : no murmur [No Rub] : no rub [No Gallop] : no gallop [Clear Lung Fields] : clear lung fields [Good Air Entry] : good air entry [No Respiratory Distress] : no respiratory distress  [Soft] : abdomen soft [Abnormal Gait] : abnormal gait [No Cyanosis] : no cyanosis [No Clubbing] : no clubbing [No Varicosities] : no varicosities [No Rash] : no rash [Normal Speech] : normal speech [Alert and Oriented] : alert and oriented [de-identified] : trace pedal edema [de-identified] : left hemiparesis

## 2023-03-07 NOTE — ASSESSMENT
[FreeTextEntry1] :  73 year old woman with pulmonary carcinoid tumor s/p wedge resection, ESRD on HD via LUE AV fistula (T/R/S), CVA with residual left hemiparesis, hypertension, dual chamber pacemaker placed for syncope, and insulin dependent diabetes. No evidence of significant valvulopathy or right sided heart failure.\par \par # Blood pressure difficult to control in setting of fluid shifts and is elevated today.\par - will change amlodipine to nifedipine 90 ER\par - continue carvedilol 6.25 BID\par - Hydralazine\par \par Her pacemaker is followed at [facility].\par \par # Non-obstructive CAD - mild luminal irregularities on [facility] 5/2022, possible microvascular ischemia\par - continue atorvastatin 20\par - was intolerant to aspirin in the past\par - continue clopidogrel\par \par # Neuroendocrine tumor: lung involvement but no significant hepatic disease\par - mild aortic valve thickening noted previously; normal TV, mild-moderate TR (likely from pacing wire)\par - biomarkers checked 4/2022 - stable\par - repeat echo now\par - continue somatostatin analog\par \par # Assessment and Recommendations for cataract extraction surgery, March 16 2023 (Dr. Bi Garza, [facility])\par - may proceed with this low risk procedure from the cardiovascular point of view\par - may hold clopidogrel for 5 days prior if needed, resume when bleeding risk permits\par - take other medications with a small sip of water on AM of procedure\par - if using electrocautery, place magnet over pacemaker\par - prophylaxis for carcinoid crisis as per anesthesia and medical oncology. She is on maintenance octreotide.\par  \par Above recommendations were discussed with the patient and her  and all questions answered to the best of my ability and their apparent satisfaction.\par \par Follow up in 6 months with me

## 2023-03-07 NOTE — REASON FOR VISIT
[Spouse] : spouse [FreeTextEntry3] : Dr. Josh Campo [FreeTextEntry1] : UMA HENDERSON is a 73 year old woman with pulmonary carcinoid tumor s/p wedge resection, ESRD on HD via LUE AV fistula (T/R/S), CVA with residual left hemiparesis, hypertension, dual chamber pacemaker placed for syncope, and insulin dependent diabetes who returns for follow up.

## 2023-03-30 ENCOUNTER — APPOINTMENT (OUTPATIENT)
Dept: CV DIAGNOSITCS | Facility: HOSPITAL | Age: 73
End: 2023-03-30

## 2023-03-30 ENCOUNTER — OUTPATIENT (OUTPATIENT)
Dept: OUTPATIENT SERVICES | Facility: HOSPITAL | Age: 73
LOS: 1 days | End: 2023-03-30
Payer: MEDICARE

## 2023-03-30 DIAGNOSIS — R06.02 SHORTNESS OF BREATH: ICD-10-CM

## 2023-03-30 DIAGNOSIS — C7A.090 MALIGNANT CARCINOID TUMOR OF THE BRONCHUS AND LUNG: ICD-10-CM

## 2023-03-30 DIAGNOSIS — I77.0 ARTERIOVENOUS FISTULA, ACQUIRED: Chronic | ICD-10-CM

## 2023-03-30 PROCEDURE — 93306 TTE W/DOPPLER COMPLETE: CPT | Mod: 26

## 2023-05-09 ENCOUNTER — APPOINTMENT (OUTPATIENT)
Dept: PODIATRY | Facility: CLINIC | Age: 73
End: 2023-05-09
Payer: MEDICARE

## 2023-05-09 DIAGNOSIS — L60.0 INGROWING NAIL: ICD-10-CM

## 2023-05-09 DIAGNOSIS — M20.10 HALLUX VALGUS (ACQUIRED), UNSPECIFIED FOOT: ICD-10-CM

## 2023-05-09 PROCEDURE — 99212 OFFICE O/P EST SF 10 MIN: CPT

## 2023-05-10 PROBLEM — L60.0 INGROWN NAIL: Status: ACTIVE | Noted: 2023-02-15

## 2023-05-11 PROBLEM — M20.10 HALLUX VALGUS: Status: ACTIVE | Noted: 2023-05-10

## 2023-05-11 NOTE — HISTORY OF PRESENT ILLNESS
Unit Case Management follow-up regarding [facility] home care. Noted that patient resides in [place] and weekend CM faxed referral to [facility]. Writer received call from Fransisca with [facility] who confirms that [facility] is in-network with patient's insurance. There is a co-pay of $30 per visit. Writer met with patient, who is agreeable to costs.    [Sneakers] : mayi [Walker] : a walker [FreeTextEntry1] : Patient presents today for diabetic foot care. A1c is 7.5. Fasting sugar is 250. Patient last saw Dr. Patricia 1 month ago. Right hallux tibial incurvated nail. She is in a wheelchair. Right HAV with bunion.\par

## 2023-05-11 NOTE — PHYSICAL EXAM
[Ankle Swelling (On Exam)] : present [Ankle Swelling Bilaterally] : bilaterally  [Delayed in the Right Toes] : capillary refills delayed in the right toes [Delayed in the Left Toes] : capillary refills delayed in the left toes [0] : left foot dorsalis pedis 0 [FreeTextEntry3] : Absent hair growth. Skin warm to cool. [de-identified] : Right HAV with bunion. She has equinus and tight posterior muscle group. [FreeTextEntry4] : absent vibratory  [FreeTextEntry8] : absent vibratory  [FreeTextEntry1] : [place]-Ellen monofilament testing absent at the hallux, 1st MPJ and heel bilateral. The patient has a class finding of Q9-One Class B and 2 Class C findings.

## 2023-05-11 NOTE — ASSESSMENT
[FreeTextEntry1] : \par Impression: Ingrown nail. Hallux valgus.\par \par Treatment: I gave her some home seated stretching and therapy exercises to work on. I performed a wedge resection at the ingrown nail without incident and debrided other dystrophic nails without incident. She needs to inspect her foot. Her shoes are orthopedic and adequate. She will follow-up in the office with continued pain that persists.

## 2023-08-08 ENCOUNTER — APPOINTMENT (OUTPATIENT)
Dept: PODIATRY | Facility: CLINIC | Age: 73
End: 2023-08-08
Payer: MEDICARE

## 2023-08-08 PROCEDURE — 11719 TRIM NAIL(S) ANY NUMBER: CPT

## 2023-08-14 NOTE — HISTORY OF PRESENT ILLNESS
[Wheelchair] : a wheelchair [FreeTextEntry1] : Patient returns today for management of painful, elongated nails that the patient cannot care for herself.  Patient is diabetic.  Finger stick today was 143.  Last A1c was 7.5%.  She last saw Dr. Patricia one month ago.  Patient is also regularly seen by a nephrologist at the dialysis center.  She reports no new medical changes.

## 2023-08-14 NOTE — PHYSICAL EXAM
[Ankle Swelling (On Exam)] : present [Ankle Swelling Bilaterally] : bilaterally  [0] : left foot posterior tibialis 0 [1+] : left foot dorsalis pedis 1+ [Varicose Veins Of Lower Extremities] : not present [FreeTextEntry3] : CFT: > 3 seconds x 10.  Temperature gradient: warm to cool.  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  Gastroc-soleus equinus present bilaterally.   [FreeTextEntry4] : absent [FreeTextEntry8] : absent [FreeTextEntry1] : [place]-Ellen testing absent at the hallux, 1st MPJ and heels, bilateral.  Q9-The patient has a class finding of Q9-One Class B and 2 Class C findings

## 2023-08-14 NOTE — ASSESSMENT
[FreeTextEntry1] : Impression: Diabetes with neuropathy (E11.42).  Onychodystrophy (L60.3).   Treatment: Discussed the importance of glycemic control, diabetic foot care and neuropathic precautions.  Patient is wearing adequately fitted sneakers today.   Nails 1 through 5, bilateral were wiped with alcohol and trimmed with sterile nippers to hygienic lengths.   Failure to perform this treatment based on patient's underlying condition could lead to ulceration and infection. Return: 3 months.

## 2023-09-08 ENCOUNTER — APPOINTMENT (OUTPATIENT)
Dept: CARDIOLOGY | Facility: CLINIC | Age: 73
End: 2023-09-08
Payer: MEDICARE

## 2023-09-08 VITALS
TEMPERATURE: 97.7 F | HEART RATE: 70 BPM | SYSTOLIC BLOOD PRESSURE: 136 MMHG | DIASTOLIC BLOOD PRESSURE: 67 MMHG | OXYGEN SATURATION: 98 %

## 2023-09-08 DIAGNOSIS — Z86.79 PERSONAL HISTORY OF OTHER DISEASES OF THE CIRCULATORY SYSTEM: ICD-10-CM

## 2023-09-08 DIAGNOSIS — Z86.39 PERSONAL HISTORY OF OTHER ENDOCRINE, NUTRITIONAL AND METABOLIC DISEASE: ICD-10-CM

## 2023-09-08 DIAGNOSIS — I99.8 OTHER DISORDER OF CIRCULATORY SYSTEM: ICD-10-CM

## 2023-09-08 DIAGNOSIS — Z87.39 PERSONAL HISTORY OF OTHER DISEASES OF THE MUSCULOSKELETAL SYSTEM AND CONNECTIVE TISSUE: ICD-10-CM

## 2023-09-08 DIAGNOSIS — L02.611 CUTANEOUS ABSCESS OF RIGHT FOOT: ICD-10-CM

## 2023-09-08 DIAGNOSIS — S90.122A CONTUSION OF LEFT LESSER TOE(S) W/OUT DAMAGE TO NAIL, INITIAL ENCOUNTER: ICD-10-CM

## 2023-09-08 PROCEDURE — 99214 OFFICE O/P EST MOD 30 MIN: CPT

## 2023-09-08 PROCEDURE — 93010 ELECTROCARDIOGRAM REPORT: CPT

## 2023-09-08 PROCEDURE — 93000 ELECTROCARDIOGRAM COMPLETE: CPT

## 2023-09-08 RX ORDER — NIFEDIPINE 90 MG/1
90 TABLET, EXTENDED RELEASE ORAL DAILY
Qty: 90 | Refills: 2 | Status: ACTIVE | COMMUNITY
Start: 2023-03-07 | End: 1900-01-01

## 2023-09-08 NOTE — PHYSICAL EXAM
[Well Developed] : well developed [Well Nourished] : well nourished [No Acute Distress] : no acute distress [Normal Conjunctiva] : normal conjunctiva [No Xanthelasma] : no xanthelasma [No Carotid Bruit] : no carotid bruit [Normal S1, S2] : normal S1, S2 [No Murmur] : no murmur [No Rub] : no rub [No Gallop] : no gallop [Clear Lung Fields] : clear lung fields [Good Air Entry] : good air entry [No Respiratory Distress] : no respiratory distress  [Soft] : abdomen soft [Abnormal Gait] : abnormal gait [No Cyanosis] : no cyanosis [No Clubbing] : no clubbing [No Varicosities] : no varicosities [No Rash] : no rash [Normal Speech] : normal speech [Alert and Oriented] : alert and oriented [de-identified] : trace pedal edema [de-identified] : left hemiparesis

## 2023-09-08 NOTE — ASSESSMENT
[FreeTextEntry1] : ------------------  73 year old woman with pulmonary carcinoid tumor s/p wedge resection, ESRD on HD via LUE AV fistula (T/R/S), CVA with residual left hemiparesis, hypertension, dual chamber pacemaker placed for syncope, and insulin dependent diabetes.  # Blood pressure difficult to control in setting of fluid shifts - will change amlodipine to nifedipine 90 ER - continue carvedilol 6.25 BID - Hydralazine  Her pacemaker is followed at [facility]. I asked her to have the interrogation report forwarded for our records.   # Non-obstructive CAD - mild luminal irregularities on [facility] 5/2022, possible microvascular ischemia - continue atorvastatin 20 - was intolerant to aspirin in the past - continue clopidogrel  # Neuroendocrine tumor: lung involvement but no significant hepatic disease - mild aortic valve thickening noted previously; normal TV, mild-moderate TR (likely from pacing wire) - biomarkers checked 4/2022 - stable - repeat echo now - continue somatostatin analog  # Non-cardiac chest pain, suspect related to spastic paralysis  Above recommendations were discussed with the patient and her  and all questions answered to the best of my ability and their apparent satisfaction.  Follow up in 6 months with me

## 2023-09-08 NOTE — HISTORY OF PRESENT ILLNESS
[FreeTextEntry1] : Interval History: Neuroendocrine tumor stable per patient on somatostatin analog - no recent changes. She continues to experience left sided chest "spasm" during hemodialysis, which improves with IV saline or hot compresses. BP has been better controlled recently. She never started nifedipine, but takes amlodipine 10 mg daily, with carvedilol and hydralazine on non-dialysis days. She has an appointment for pacemaker interrogation and device follow up with Dr. Crowley at [facility] in November.  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  She reports dyspnea and more labored breathing prior to hemodialysis which is stable and unchanged for several years. She denies any chest pain, palpitations, abdominal distension, lower extremity edema, dizziness/lightheadedness. Carcinoid symptoms are under control on monthly somatostatin therapy.  She is not aware of any history of cardiovascular disease and reports a cardiac catheterization done at [facility] in 2019 showed no obstructive disease. Her last echocardiogram in  showed normal LV systolic function; tricuspid valve was not well visualized on that study. Most recent chromogranin A 165 (stable).  2022: Reports increased shortness of breath over the past 6 months or so. No chest pain, pressure, or palpitations. Remains on monthly octreotide with stable disease. No carcinoid symptoms.   2022: Feels well. Has not been taking amlodipine. Taking carvedilol and hydralazine (twice daily). No further chest pain and shortness of breath improved.  Chromogranin A levels increased slightly, so back to monthly octreotide.  2023: BP has been low after dialysis, so is not taking hydralazine or carvedilol in PM after HD. No chest pain or shortness of breath. Is having cataract extraction on  and requires cardiac clearance. Continues on monthly octreotide with Dr. Campo.   Cardiovascular Summary: ---------------------------------------------- EC2023: sinus 73 bpm, 1st degree AV block, RBBB,  3/7/2023: A-paced with prolonged AV delay, underlying sinus with 1st degree AV block, RBBB. 2022: NSR 68 bpm, 1st degree AV block, RBBB 2022: NSR 64 bpm 1st degree AV block, RBBB 2021: atrial pacemaker, RBBB.  ---------------------------------------------- Echo: 3/30/2023: EF 63 %, mild MR, mild AR, calcified aortic valve 2022: EF 65%, mild to moderate TR. Normal appearing tricuspid valve 2021: LVEF 66%, GLS -15.5, normal tricuspid valve, normal pulmonic valve, mod pulmonary HTN  ---------------------------------------------- Stress: 2022: moderate inferior ischemia ---------------------------------------------- Cath: 2022: LM minor luminal irregularities, LAD mild athero, LCx 30 % ostial disease, dRCA 20 %

## 2023-09-08 NOTE — REASON FOR VISIT
[Spouse] : spouse [Symptom and Test Evaluation] : symptom and test evaluation [FreeTextEntry3] : [facility] (Dr. Washington) [FreeTextEntry1] : ---------------- UMAVALARIE HENDERSON is a 73 year old woman with pulmonary carcinoid tumor s/p wedge resection, ESRD on HD via LUE AV fistula (T/R/S), CVA with residual left hemiparesis, hypertension, dual chamber pacemaker placed for syncope, and insulin dependent diabetes who returns for follow up.

## 2023-09-28 ENCOUNTER — OUTPATIENT (OUTPATIENT)
Dept: OUTPATIENT SERVICES | Facility: HOSPITAL | Age: 73
LOS: 1 days | End: 2023-09-28
Payer: MEDICARE

## 2023-09-28 ENCOUNTER — APPOINTMENT (OUTPATIENT)
Dept: CV DIAGNOSITCS | Facility: HOSPITAL | Age: 73
End: 2023-09-28

## 2023-09-28 ENCOUNTER — NON-APPOINTMENT (OUTPATIENT)
Age: 73
End: 2023-09-28

## 2023-09-28 DIAGNOSIS — L98.8 OTHER SPECIFIED DISORDERS OF THE SKIN AND SUBCUTANEOUS TISSUE: Chronic | ICD-10-CM

## 2023-09-28 DIAGNOSIS — C7A.090 MALIGNANT CARCINOID TUMOR OF THE BRONCHUS AND LUNG: ICD-10-CM

## 2023-09-28 DIAGNOSIS — Z95.0 PRESENCE OF CARDIAC PACEMAKER: Chronic | ICD-10-CM

## 2023-09-28 DIAGNOSIS — I77.0 ARTERIOVENOUS FISTULA, ACQUIRED: Chronic | ICD-10-CM

## 2023-09-28 DIAGNOSIS — E11.49 TYPE 2 DIABETES MELLITUS WITH OTHER DIABETIC NEUROLOGICAL COMPLICATION: ICD-10-CM

## 2023-09-28 DIAGNOSIS — N18.6 END STAGE RENAL DISEASE: ICD-10-CM

## 2023-09-28 PROCEDURE — 93356 MYOCRD STRAIN IMG SPCKL TRCK: CPT

## 2023-09-28 PROCEDURE — 93306 TTE W/DOPPLER COMPLETE: CPT | Mod: 26

## 2023-11-07 ENCOUNTER — APPOINTMENT (OUTPATIENT)
Dept: PODIATRY | Facility: CLINIC | Age: 73
End: 2023-11-07
Payer: MEDICARE

## 2023-11-07 PROCEDURE — 11719 TRIM NAIL(S) ANY NUMBER: CPT

## 2024-02-20 ENCOUNTER — APPOINTMENT (OUTPATIENT)
Dept: PODIATRY | Facility: CLINIC | Age: 74
End: 2024-02-20
Payer: MEDICARE

## 2024-02-20 DIAGNOSIS — L85.3 XEROSIS CUTIS: ICD-10-CM

## 2024-02-20 PROCEDURE — 99213 OFFICE O/P EST LOW 20 MIN: CPT | Mod: 25

## 2024-02-20 PROCEDURE — 11719 TRIM NAIL(S) ANY NUMBER: CPT

## 2024-02-20 RX ORDER — AMMONIUM LACTATE 12 %
12 CREAM (GRAM) TOPICAL TWICE DAILY
Qty: 30 | Refills: 3 | Status: ACTIVE | COMMUNITY
Start: 2024-02-20 | End: 1900-01-01

## 2024-02-23 NOTE — PHYSICAL EXAM
[Ankle Swelling (On Exam)] : present [Ankle Swelling Bilaterally] : bilaterally  [0] : left foot posterior tibialis 0 [1+] : left foot dorsalis pedis 1+ [Varicose Veins Of Lower Extremities] : not present [FreeTextEntry3] : CFT: > 3 seconds x 10. Temperature gradient: warm to cool. 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 Gastroc-soleus equinus present bilaterally. [FreeTextEntry8] : absent vibratory  [FreeTextEntry4] : absent vibratory  [FreeTextEntry1] : [place]-Ellen testing absent at the hallux, 1st MPJ and heels, bilateral. Q9-The patient has a class finding of Q9-One Class B and 2 Class C findings

## 2024-02-23 NOTE — HISTORY OF PRESENT ILLNESS
[FreeTextEntry1] : Patient returns today for at-risk foot care. She denies any new medical changes. Last A1c is 6.5. She last saw PCP in January. Patient complains of dryness. She has run out of Ammonium Lactate.

## 2024-02-23 NOTE — ASSESSMENT
[FreeTextEntry1] : Impression: Diabetes with neuropathy. Onychodystrophy. Xerosis cutis.  Treatment: Discussed the importance of glycemic control, diabetic foot care and neuropathic precautions. Patient is wearing adequately fitted sneakers today. Nails 1 through 5, bilateral were wiped with alcohol and trimmed with sterile nippers to hygienic lengths. Failure to perform this treatment based on patient's underlying condition could lead to ulceration and infection. For xerosis cutis, patient was prescribed ammonium lactate and discussed on how to use it. Return: 3 months.

## 2024-03-08 ENCOUNTER — APPOINTMENT (OUTPATIENT)
Dept: CARDIOLOGY | Facility: CLINIC | Age: 74
End: 2024-03-08
Payer: MEDICARE

## 2024-03-08 VITALS
OXYGEN SATURATION: 98 % | DIASTOLIC BLOOD PRESSURE: 67 MMHG | HEART RATE: 69 BPM | TEMPERATURE: 96.6 F | BODY MASS INDEX: 30.28 KG/M2 | WEIGHT: 149.91 LBS | SYSTOLIC BLOOD PRESSURE: 136 MMHG

## 2024-03-08 DIAGNOSIS — N18.6 END STAGE RENAL DISEASE: ICD-10-CM

## 2024-03-08 DIAGNOSIS — I10 ESSENTIAL (PRIMARY) HYPERTENSION: ICD-10-CM

## 2024-03-08 DIAGNOSIS — I70.90 UNSPECIFIED ATHEROSCLEROSIS: ICD-10-CM

## 2024-03-08 DIAGNOSIS — R07.9 CHEST PAIN, UNSPECIFIED: ICD-10-CM

## 2024-03-08 DIAGNOSIS — R06.02 SHORTNESS OF BREATH: ICD-10-CM

## 2024-03-08 DIAGNOSIS — Z99.2 END STAGE RENAL DISEASE: ICD-10-CM

## 2024-03-08 LAB
CHOLEST SERPL-MCNC: 114 MG/DL
HDLC SERPL-MCNC: 34 MG/DL
LDLC SERPL CALC-MCNC: 54 MG/DL
NONHDLC SERPL-MCNC: 80 MG/DL
TRIGL SERPL-MCNC: 153 MG/DL
TROPONIN-T, HIGH SENSITIVITY: 87 NG/L

## 2024-03-08 PROCEDURE — 93010 ELECTROCARDIOGRAM REPORT: CPT

## 2024-03-08 PROCEDURE — 93000 ELECTROCARDIOGRAM COMPLETE: CPT

## 2024-03-08 PROCEDURE — 99214 OFFICE O/P EST MOD 30 MIN: CPT

## 2024-03-08 PROCEDURE — G2211 COMPLEX E/M VISIT ADD ON: CPT

## 2024-03-08 NOTE — REVIEW OF SYSTEMS
[Negative] : Psychiatric [SOB] : shortness of breath [Dyspnea on exertion] : dyspnea during exertion [de-identified] : see HPI

## 2024-03-08 NOTE — PHYSICAL EXAM
[Well Developed] : well developed [Well Nourished] : well nourished [No Acute Distress] : no acute distress [No Xanthelasma] : no xanthelasma [Normal S1, S2] : normal S1, S2 [No Rub] : no rub [Clear Lung Fields] : clear lung fields [No Gallop] : no gallop [Good Air Entry] : good air entry [No Respiratory Distress] : no respiratory distress  [Soft] : abdomen soft [Abnormal Gait] : abnormal gait [No Cyanosis] : no cyanosis [No Clubbing] : no clubbing [No Varicosities] : no varicosities [No Rash] : no rash [Alert and Oriented] : alert and oriented [Normal Speech] : normal speech [No Edema] : no edema [Normal memory] : normal memory [de-identified] : In wheelchair [de-identified] : hearing loss [de-identified] : left hemiparesis

## 2024-03-08 NOTE — REASON FOR VISIT
[Spouse] : spouse [FreeTextEntry3] : [facility] (Dr. Washington) [FreeTextEntry1] : ---------------- UMAANA HENDERSON is a 74 year old woman with pulmonary carcinoid tumor s/p wedge resection, ESRD on HD via LUE AV fistula (T/R/S), CVA with residual left hemiparesis, hypertension, dual chamber pacemaker placed for syncope, and insulin dependent diabetes who returns for follow up.

## 2024-03-08 NOTE — ASSESSMENT
[FreeTextEntry1] : -------------------------------------------------  74 year old woman with pulmonary carcinoid tumor s/p wedge resection, ESRD on HD via LUE AV fistula (T/R/S), CVA with residual left hemiparesis, hypertension, dual chamber pacemaker placed for syncope, and insulin dependent diabetes.  # Blood pressure difficult to control in setting of fluid shifts, better lately since medication adjustment - continue nifedipine 90 ER - continue carvedilol 6.25 BID - Hydralazine  Her pacemaker is followed at [facility]. I asked her to have the interrogation report forwarded for our records.   # Non-obstructive CAD - mild luminal irregularities on [facility] 5/2022, possible microvascular ischemia - continue atorvastatin 20 - was intolerant to aspirin in the past - continue clopidogrel  # Neuroendocrine tumor: lung involvement but no significant hepatic disease - mild aortic valve thickening noted previously; normal TV, mild-moderate TR (likely from pacing wire) - biomarkers checked 4/2022 - stable - continue somatostatin analog  # Non-cardiac chest pain, suspect related to spastic paralysis, but increased new dyspnea since last visit. Given significant risk factors, will repeat stress test for correlation with prior MPI for risk stratification. - nuclear stress test - update lipid panel and A1c - adjust anti-anginal pending above data  Above recommendations were discussed with the patient and her  and all questions answered to the best of my ability and their apparent satisfaction.  Follow up in 6 months with me

## 2024-03-08 NOTE — HISTORY OF PRESENT ILLNESS
[FreeTextEntry1] : Interval History: Her blood pressure has been less labile after medication adjustment. She remains on long acting somatostatin analog and reports stable disease. She reports having increased breathlessness, especially when taking or exerting herself and is concerned about CAD. She reports not having chest pain, but difficulty breathing seems worse to her now than before. She does not have edema.  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  She reports dyspnea and more labored breathing prior to hemodialysis which is stable and unchanged for several years. She denies any chest pain, palpitations, abdominal distension, lower extremity edema, dizziness/lightheadedness. Carcinoid symptoms are under control on monthly somatostatin therapy.  She is not aware of any history of cardiovascular disease and reports a cardiac catheterization done at [facility] in 11/2019 showed no obstructive disease. Her last echocardiogram in 2020 showed normal LV systolic function; tricuspid valve was not well visualized on that study. Most recent chromogranin A 165 (stable).  April 2022: Reports increased shortness of breath over the past 6 months or so. No chest pain, pressure, or palpitations. Remains on monthly octreotide with stable disease. No carcinoid symptoms.  August 29, 2022: Feels well. Has not been taking amlodipine. Taking carvedilol and hydralazine (twice daily). No further chest pain and shortness of breath improved.  Chromogranin A levels increased slightly, so back to monthly octreotide.  March 7, 2023: BP has been low after dialysis, so is not taking hydralazine or carvedilol in PM after HD. No chest pain or shortness of breath. Is having cataract extraction on March 16th and requires cardiac clearance. Continues on monthly octreotide with Dr. Campo.  Sep 8, 2023: Neuroendocrine tumor stable per patient on somatostatin analog - no recent changes. She continues to experience left sided chest "spasm" during hemodialysis, which improves with IV saline or hot compresses. BP has been better controlled recently. She never started nifedipine, but takes amlodipine 10 mg daily, with carvedilol and hydralazine on non-dialysis days. She has an appointment for pacemaker interrogation and device follow up with Dr. Crowley at [facility] in November.  Cardiovascular Summary: ---------------------------------------------- ECG: 3/8/2024: sinus 1st degree AV block, RBBB 9/8/2023: sinus 73 bpm, 1st degree AV block, RBBB,  3/7/2023: A-paced with prolonged AV delay, underlying sinus with 1st degree AV block, RBBB. 8/29/2022: NSR 68 bpm, 1st degree AV block, RBBB 4/25/2022: NSR 64 bpm 1st degree AV block, RBBB 4/5/2021: atrial pacemaker, RBBB.  ---------------------------------------------- Echo: 9/28/2023: EF 61 %, mild TR, mild MR, calcified AV, no apparent carcinoid heart disease 3/30/2023: EF 63 %, mild MR, mild AR, calcified aortic valve 5/4/2022: EF 65%, mild to moderate TR. Normal appearing tricuspid valve 4/5/2021: LVEF 66%, GLS -15.5, normal tricuspid valve, normal pulmonic valve, mod pulmonary HTN  ---------------------------------------------- Stress: 5/2/2022: moderate inferior ischemia ---------------------------------------------- Cath: 5/11/2022: LM minor luminal irregularities, LAD mild athero, LCx 30 % ostial disease, dRCA 20 %

## 2024-03-14 ENCOUNTER — OUTPATIENT (OUTPATIENT)
Dept: OUTPATIENT SERVICES | Facility: HOSPITAL | Age: 74
LOS: 1 days | Discharge: HOME | End: 2024-03-14
Payer: MEDICARE

## 2024-03-14 DIAGNOSIS — I77.0 ARTERIOVENOUS FISTULA, ACQUIRED: Chronic | ICD-10-CM

## 2024-03-14 DIAGNOSIS — Z95.0 PRESENCE OF CARDIAC PACEMAKER: Chronic | ICD-10-CM

## 2024-03-14 DIAGNOSIS — L98.8 OTHER SPECIFIED DISORDERS OF THE SKIN AND SUBCUTANEOUS TISSUE: Chronic | ICD-10-CM

## 2024-03-14 DIAGNOSIS — D64.9 ANEMIA, UNSPECIFIED: ICD-10-CM

## 2024-03-26 ENCOUNTER — APPOINTMENT (OUTPATIENT)
Dept: HEMATOLOGY ONCOLOGY | Facility: CLINIC | Age: 74
End: 2024-03-26
Payer: MEDICARE

## 2024-03-26 VITALS
RESPIRATION RATE: 16 BRPM | HEIGHT: 59.09 IN | WEIGHT: 153.5 LBS | TEMPERATURE: 96.3 F | DIASTOLIC BLOOD PRESSURE: 68 MMHG | BODY MASS INDEX: 30.95 KG/M2 | HEART RATE: 68 BPM | SYSTOLIC BLOOD PRESSURE: 143 MMHG | OXYGEN SATURATION: 96 %

## 2024-03-26 DIAGNOSIS — D64.9 ANEMIA, UNSPECIFIED: ICD-10-CM

## 2024-03-26 PROCEDURE — 99205 OFFICE O/P NEW HI 60 MIN: CPT

## 2024-03-26 PROCEDURE — G2212 PROLONG OUTPT/OFFICE VIS: CPT

## 2024-03-26 PROCEDURE — G2211 COMPLEX E/M VISIT ADD ON: CPT

## 2024-03-26 RX ORDER — ICOSAPENT ETHYL 500 MG/1
CAPSULE ORAL
Refills: 0 | Status: ACTIVE | COMMUNITY

## 2024-03-26 RX ORDER — LOPERAMIDE HCL 2 MG
TABLET ORAL
Refills: 0 | Status: ACTIVE | COMMUNITY

## 2024-03-26 NOTE — PHYSICAL EXAM
[Capable of only limited self care, confined to bed or chair more than 50% of waking hours] : Status 3- Capable of only limited self care, confined to bed or chair more than 50% of waking hours [Normal] : affect appropriate [de-identified] : No icterus  [de-identified] : MMM O/P Clear [de-identified] : Supple  [de-identified] : Distant BS [de-identified] : S1 S2 [de-identified] : 2+ LE edema [de-identified] : No spine tenderness  [de-identified] : Left sided weakness

## 2024-03-26 NOTE — RESULTS/DATA
[FreeTextEntry1] : - [facility] PET/CT 11/2/2023: No definite PET/CT evidence of neuroendocrine tumor.  Multiple pulmonary nodules are again identified.  These are similar to the prior exam and are likely inflammatory although nonspecific.  - Labs 2/6/2024: WBC 6.0 hemoglobin 10.5 MCV 94 platelets 128K.  ANC 4.1. Chromogranin A elevated at 122 NG/mL. Serum creatinine 5.1.  LFTs normal.

## 2024-03-26 NOTE — HISTORY OF PRESENT ILLNESS
[Disease: _____________________] : Disease: [unfilled] [de-identified] : 74 F from [facility] with history of ovarian cancer status post NEISHA/BSO in 2001 (versus cervical cancer which the patient reported), DM, HTN, CVA and ESRD on HD 3 times weekly.  Patient has lung nodules known since at least 2016.  Patient underwent PIEDAD and LLL wedge resections in March 2017 with pathology revealing typical carcinoid tumor, staged as T4 NX, however it was unclear if these represented primary tumors versus metastases.  Patient is being monitored with surveillance scans.  Patient was started on Sandostatin LAR injections in May 2018 which she has continued monthly.  Most recent scan from November 2023 was a PET dotatate scan showing no evidence of active disease; multiple pulmonary nodules were identified which are felt to be related to her metastatic carcinoid tumor.  Patient presents today to establish care.  She is with her .  Her most recent octreotide injection was on 3/12/2024. She reports occasional diarrhea that is unpredictable and lasts up to 1 day if it occurs.  Diarrhea responds to either loperamide or Imodium.  She reports sweats in the mornings.  No flushing.  Breathing is satisfactory.  She has left-sided weakness status post CVA.  She uses a wheelchair but is able to ambulate with a cane.  Weight is stable. She is on HD 3 times weekly; she does not make much urine. She is a retired internal medicine physician retiring in 2014. [de-identified] : - Lung, LLL wedge resection 3/1/2017: Typical carcinoid tumor, 0.5 cm. Lung, PIEDAD wedge resection 3/1/2017: Typical carcinoid tumor, 0.8 cm. Note: Histologic sections in both specimens show nests and trabeculae of medium sized mildly pleomorphic polygonal cells with slightly eosinophilic cytoplasm, low nuclear grade.  Minimal mitotic activity (<1 per 10 HPF) is present.  No necrosis seen.  IHC is positive for CK7, TTF-1, CD56, chromogranin and synaptophysin while negative for CK20, Napsin-a, p63, CK5/6 and PAX8.  Rare cells weakly express p53.  Ki-67 is low, about 1%.  These findings support the diagnosis of typical carcinoid tumor.  It is difficult to be certain if these lesions represent primary lung neoplasm or metastatic process.  Presumption is that the tumor is of lung origin. GenPath NGS: EGFR and KRAS negative/no mutations identified. ALK negative.  ROS1 rearrangement negative.

## 2024-03-26 NOTE — REASON FOR VISIT
[Initial Consultation] : an initial consultation [Spouse] : spouse [FreeTextEntry2] : Carcinoid Tumor of Lung

## 2024-03-26 NOTE — ASSESSMENT
[FreeTextEntry1] : Malignant carcinoid tumor of lung status post PIEDAD and LLL wedge resections in March 2017 establishing her diagnosis.  She has evidence of lung metastases.  She is maintained on monthly Sandostatin LAR 30 mg IM every 4 weeks since May 2018. Recommend: - Continue Sandostatin LAR 30 mg IM q. 4 weeks.  Due for new injection on 4/9/2024. - Obtain blood work at time of her next injection. - Chromogranin A is noted to be elevated, however, the value of this is not clear in the setting of renal failure.  Would not likely alter management based on this result. - Diarrhea: Likely side effect of octreotide.  Can utilize Imodium or loperamide as needed.  This occurs intermittently and appears to be manageable. - Anemia: Patient receives Epogen treatment at dialysis which she undergoes M-W-F.  Can repeat labs at her April injection appointment including initial anemia evaluation. - Will have the patient follow-up prior to her planned injection in May with a restaging CT chest obtained beforehand. All questions answered to their apparent satisfaction [Palliative] : Goals of care discussed with patient: Palliative

## 2024-03-27 ENCOUNTER — NON-APPOINTMENT (OUTPATIENT)
Age: 74
End: 2024-03-27

## 2024-03-28 ENCOUNTER — APPOINTMENT (OUTPATIENT)
Dept: CV DIAGNOSTICS | Facility: HOSPITAL | Age: 74
End: 2024-03-28

## 2024-03-28 ENCOUNTER — OUTPATIENT (OUTPATIENT)
Dept: OUTPATIENT SERVICES | Facility: HOSPITAL | Age: 74
LOS: 1 days | End: 2024-03-28
Payer: MEDICARE

## 2024-03-28 ENCOUNTER — RESULT REVIEW (OUTPATIENT)
Age: 74
End: 2024-03-28

## 2024-03-28 DIAGNOSIS — I77.0 ARTERIOVENOUS FISTULA, ACQUIRED: Chronic | ICD-10-CM

## 2024-03-28 DIAGNOSIS — R06.02 SHORTNESS OF BREATH: ICD-10-CM

## 2024-03-28 DIAGNOSIS — I70.90 UNSPECIFIED ATHEROSCLEROSIS: ICD-10-CM

## 2024-03-28 DIAGNOSIS — Z95.0 PRESENCE OF CARDIAC PACEMAKER: Chronic | ICD-10-CM

## 2024-03-28 DIAGNOSIS — E11.49 TYPE 2 DIABETES MELLITUS WITH OTHER DIABETIC NEUROLOGICAL COMPLICATION: ICD-10-CM

## 2024-03-28 DIAGNOSIS — I10 ESSENTIAL (PRIMARY) HYPERTENSION: ICD-10-CM

## 2024-03-28 DIAGNOSIS — N18.6 END STAGE RENAL DISEASE: ICD-10-CM

## 2024-03-28 DIAGNOSIS — R07.9 CHEST PAIN, UNSPECIFIED: ICD-10-CM

## 2024-03-28 DIAGNOSIS — L98.8 OTHER SPECIFIED DISORDERS OF THE SKIN AND SUBCUTANEOUS TISSUE: Chronic | ICD-10-CM

## 2024-03-28 PROCEDURE — 78452 HT MUSCLE IMAGE SPECT MULT: CPT | Mod: 26,MC

## 2024-03-28 PROCEDURE — 93018 CV STRESS TEST I&R ONLY: CPT | Mod: GC,MC

## 2024-03-28 PROCEDURE — 93016 CV STRESS TEST SUPVJ ONLY: CPT | Mod: GC,MC

## 2024-04-02 ENCOUNTER — NON-APPOINTMENT (OUTPATIENT)
Age: 74
End: 2024-04-02

## 2024-04-09 ENCOUNTER — APPOINTMENT (OUTPATIENT)
Dept: INFUSION THERAPY | Facility: HOSPITAL | Age: 74
End: 2024-04-09

## 2024-04-09 ENCOUNTER — APPOINTMENT (OUTPATIENT)
Dept: HEMATOLOGY ONCOLOGY | Facility: CLINIC | Age: 74
End: 2024-04-09

## 2024-04-09 ENCOUNTER — RESULT REVIEW (OUTPATIENT)
Age: 74
End: 2024-04-09

## 2024-04-09 LAB
BASOPHILS # BLD AUTO: 0.03 K/UL — SIGNIFICANT CHANGE UP (ref 0–0.2)
BASOPHILS NFR BLD AUTO: 0.5 % — SIGNIFICANT CHANGE UP (ref 0–2)
EOSINOPHIL # BLD AUTO: 0.18 K/UL — SIGNIFICANT CHANGE UP (ref 0–0.5)
EOSINOPHIL NFR BLD AUTO: 3.2 % — SIGNIFICANT CHANGE UP (ref 0–6)
HCT VFR BLD CALC: 34.1 % — LOW (ref 34.5–45)
HGB BLD-MCNC: 10.6 G/DL — LOW (ref 11.5–15.5)
IMM GRANULOCYTES NFR BLD AUTO: 0.9 % — SIGNIFICANT CHANGE UP (ref 0–0.9)
LYMPHOCYTES # BLD AUTO: 0.89 K/UL — LOW (ref 1–3.3)
LYMPHOCYTES # BLD AUTO: 15.6 % — SIGNIFICANT CHANGE UP (ref 13–44)
MCHC RBC-ENTMCNC: 29.3 PG — SIGNIFICANT CHANGE UP (ref 27–34)
MCHC RBC-ENTMCNC: 31.1 G/DL — LOW (ref 32–36)
MCV RBC AUTO: 94.2 FL — SIGNIFICANT CHANGE UP (ref 80–100)
MONOCYTES # BLD AUTO: 0.45 K/UL — SIGNIFICANT CHANGE UP (ref 0–0.9)
MONOCYTES NFR BLD AUTO: 7.9 % — SIGNIFICANT CHANGE UP (ref 2–14)
NEUTROPHILS # BLD AUTO: 4.11 K/UL — SIGNIFICANT CHANGE UP (ref 1.8–7.4)
NEUTROPHILS NFR BLD AUTO: 71.9 % — SIGNIFICANT CHANGE UP (ref 43–77)
NRBC # BLD: 0 /100 WBCS — SIGNIFICANT CHANGE UP (ref 0–0)
PLATELET # BLD AUTO: 132 K/UL — LOW (ref 150–400)
RBC # BLD: 3.62 M/UL — LOW (ref 3.8–5.2)
RBC # FLD: 13.2 % — SIGNIFICANT CHANGE UP (ref 10.3–14.5)
RETICS #: 91.6 K/UL — SIGNIFICANT CHANGE UP (ref 25–125)
RETICS/RBC NFR: 2.5 % — SIGNIFICANT CHANGE UP (ref 0.5–2.5)
WBC # BLD: 5.71 K/UL — SIGNIFICANT CHANGE UP (ref 3.8–10.5)
WBC # FLD AUTO: 5.71 K/UL — SIGNIFICANT CHANGE UP (ref 3.8–10.5)

## 2024-04-10 DIAGNOSIS — Z51.89 ENCOUNTER FOR OTHER SPECIFIED AFTERCARE: ICD-10-CM

## 2024-04-10 DIAGNOSIS — C7A.090 MALIGNANT CARCINOID TUMOR OF THE BRONCHUS AND LUNG: ICD-10-CM

## 2024-04-10 LAB
ALBUMIN SERPL ELPH-MCNC: 4.7 G/DL
ALP BLD-CCNC: 71 U/L
ALT SERPL-CCNC: 15 U/L
ANION GAP SERPL CALC-SCNC: 15 MMOL/L
AST SERPL-CCNC: 17 U/L
BILIRUB SERPL-MCNC: 0.4 MG/DL
BUN SERPL-MCNC: 33 MG/DL
CALCIUM SERPL-MCNC: 10.3 MG/DL
CHLORIDE SERPL-SCNC: 97 MMOL/L
CO2 SERPL-SCNC: 28 MMOL/L
CREAT SERPL-MCNC: 5.73 MG/DL
EGFR: 7 ML/MIN/1.73M2
FERRITIN SERPL-MCNC: 502 NG/ML
FOLATE SERPL-MCNC: >20 NG/ML
GLUCOSE SERPL-MCNC: 166 MG/DL
IRON SATN MFR SERPL: 25 %
IRON SERPL-MCNC: 75 UG/DL
LDH SERPL-CCNC: 208 U/L
POTASSIUM SERPL-SCNC: 4.9 MMOL/L
PROT SERPL-MCNC: 7.8 G/DL
SODIUM SERPL-SCNC: 139 MMOL/L
TIBC SERPL-MCNC: 298 UG/DL
TSH SERPL-ACNC: 1.97 UIU/ML
UIBC SERPL-MCNC: 223 UG/DL
VIT B12 SERPL-MCNC: 489 PG/ML

## 2024-04-11 ENCOUNTER — NON-APPOINTMENT (OUTPATIENT)
Age: 74
End: 2024-04-11

## 2024-04-11 LAB
HBV CORE IGG+IGM SER QL: REACTIVE
HBV SURFACE AB SER QL: REACTIVE
HBV SURFACE AG SER QL: NONREACTIVE
HCV AB SER QL: NONREACTIVE
HCV S/CO RATIO: 0.11 S/CO

## 2024-04-15 LAB — CGA SERPL-MCNC: 154.7 NG/ML

## 2024-05-02 ENCOUNTER — OUTPATIENT (OUTPATIENT)
Dept: OUTPATIENT SERVICES | Facility: HOSPITAL | Age: 74
LOS: 1 days | End: 2024-05-02
Payer: MEDICARE

## 2024-05-02 ENCOUNTER — APPOINTMENT (OUTPATIENT)
Dept: CT IMAGING | Facility: CLINIC | Age: 74
End: 2024-05-02

## 2024-05-02 DIAGNOSIS — L98.8 OTHER SPECIFIED DISORDERS OF THE SKIN AND SUBCUTANEOUS TISSUE: Chronic | ICD-10-CM

## 2024-05-02 DIAGNOSIS — Z95.0 PRESENCE OF CARDIAC PACEMAKER: Chronic | ICD-10-CM

## 2024-05-02 DIAGNOSIS — I77.0 ARTERIOVENOUS FISTULA, ACQUIRED: Chronic | ICD-10-CM

## 2024-05-02 DIAGNOSIS — C7A.090 MALIGNANT CARCINOID TUMOR OF THE BRONCHUS AND LUNG: ICD-10-CM

## 2024-05-02 DIAGNOSIS — C78.00 SECONDARY MALIGNANT NEOPLASM OF UNSPECIFIED LUNG: ICD-10-CM

## 2024-05-02 PROCEDURE — 71250 CT THORAX DX C-: CPT

## 2024-05-02 PROCEDURE — 71250 CT THORAX DX C-: CPT | Mod: 26,MH

## 2024-05-07 ENCOUNTER — OUTPATIENT (OUTPATIENT)
Dept: OUTPATIENT SERVICES | Facility: HOSPITAL | Age: 74
LOS: 1 days | Discharge: HOME | End: 2024-05-07

## 2024-05-07 DIAGNOSIS — L98.8 OTHER SPECIFIED DISORDERS OF THE SKIN AND SUBCUTANEOUS TISSUE: Chronic | ICD-10-CM

## 2024-05-07 DIAGNOSIS — I77.0 ARTERIOVENOUS FISTULA, ACQUIRED: Chronic | ICD-10-CM

## 2024-05-07 DIAGNOSIS — Z95.0 PRESENCE OF CARDIAC PACEMAKER: Chronic | ICD-10-CM

## 2024-05-07 DIAGNOSIS — D64.9 ANEMIA, UNSPECIFIED: ICD-10-CM

## 2024-05-14 ENCOUNTER — APPOINTMENT (OUTPATIENT)
Dept: HEMATOLOGY ONCOLOGY | Facility: CLINIC | Age: 74
End: 2024-05-14
Payer: MEDICARE

## 2024-05-14 ENCOUNTER — APPOINTMENT (OUTPATIENT)
Dept: INFUSION THERAPY | Facility: HOSPITAL | Age: 74
End: 2024-05-14

## 2024-05-14 VITALS
WEIGHT: 149.91 LBS | TEMPERATURE: 97.7 F | BODY MASS INDEX: 30.22 KG/M2 | HEIGHT: 59.09 IN | DIASTOLIC BLOOD PRESSURE: 67 MMHG | RESPIRATION RATE: 16 BRPM | HEART RATE: 62 BPM | SYSTOLIC BLOOD PRESSURE: 145 MMHG | OXYGEN SATURATION: 95 %

## 2024-05-14 DIAGNOSIS — C7A.090 MALIGNANT CARCINOID TUMOR OF THE BRONCHUS AND LUNG: ICD-10-CM

## 2024-05-14 DIAGNOSIS — C78.00 SECONDARY MALIGNANT NEOPLASM OF UNSPECIFIED LUNG: ICD-10-CM

## 2024-05-14 PROCEDURE — G2211 COMPLEX E/M VISIT ADD ON: CPT

## 2024-05-14 PROCEDURE — 99214 OFFICE O/P EST MOD 30 MIN: CPT

## 2024-05-21 ENCOUNTER — APPOINTMENT (OUTPATIENT)
Dept: PODIATRY | Facility: CLINIC | Age: 74
End: 2024-05-21
Payer: MEDICARE

## 2024-05-21 DIAGNOSIS — E11.49 TYPE 2 DIABETES MELLITUS WITH OTHER DIABETIC NEUROLOGICAL COMPLICATION: ICD-10-CM

## 2024-05-21 DIAGNOSIS — L60.3 NAIL DYSTROPHY: ICD-10-CM

## 2024-05-21 PROCEDURE — 11719 TRIM NAIL(S) ANY NUMBER: CPT

## 2024-05-24 PROBLEM — L60.3 NAIL DYSTROPHY: Status: ACTIVE | Noted: 2022-08-12

## 2024-05-24 PROBLEM — E11.49 DM (DIABETES MELLITUS), TYPE 2 WITH NEUROLOGICAL COMPLICATIONS: Status: ACTIVE | Noted: 2022-08-29

## 2024-05-24 NOTE — REASON FOR VISIT
[Follow-Up Visit] : a follow-up visit for [Ingrown Nail] : ingrown nail [Onychomycosis] : onychomycosis [Other:___] : [unfilled] [Spouse] : spouse

## 2024-05-27 PROBLEM — C78.00 SECONDARY CANCER OF LUNG: Status: ACTIVE | Noted: 2024-03-26

## 2024-05-27 NOTE — PHYSICAL EXAM
[Capable of only limited self care, confined to bed or chair more than 50% of waking hours] : Status 3- Capable of only limited self care, confined to bed or chair more than 50% of waking hours [Normal] : affect appropriate [de-identified] : No icterus  [de-identified] : MMM O/P Clear [de-identified] : Supple  [de-identified] : Distant BS [de-identified] : 2+ LE edema [de-identified] : S1 S2 [de-identified] : No spine tenderness  [de-identified] : Left sided weakness

## 2024-05-27 NOTE — RESULTS/DATA
[FreeTextEntry1] : - Dotatate PET/CT 11/2/2023: No definite PET/CT evidence of neuroendocrine tumor.  Multiple pulmonary nodules are again identified.  These are similar to the prior exam and are likely inflammatory although nonspecific.  Images Reviewed/Interpreted:  -CT Chest 5/2/24:  Patient is status post wedge resections in the left upper and left lower lobes.  Multiple nodules are noted within both lungs.

## 2024-05-27 NOTE — REASON FOR VISIT
[Initial Consultation] : an initial consultation [Spouse] : spouse [Follow-Up Visit] : a follow-up [FreeTextEntry2] : Carcinoid Tumor of Lung

## 2024-05-27 NOTE — HISTORY OF PRESENT ILLNESS
[Disease: _____________________] : Disease: [unfilled] [de-identified] : 74 F from [facility] with history of ovarian cancer status post NEISHA/BSO in 2001 (versus cervical cancer which the patient reported), DM, HTN, CVA and ESRD on HD 3 times weekly.  Patient has lung nodules known since at least 2016.  Patient underwent PIEDAD and LLL wedge resections in March 2017 with pathology revealing typical carcinoid tumor, staged as T4 NX, however it was unclear if these represented primary tumors versus metastases.  Patient is being monitored with surveillance scans.  Patient was started on Sandostatin LAR injections in May 2018 which she has continued monthly.  Most recent scan from November 2023 was a PET dotatate scan showing no evidence of active disease; multiple pulmonary nodules were identified which are felt to be related to her metastatic carcinoid tumor.   [de-identified] : - Lung, LLL wedge resection 3/1/2017: Typical carcinoid tumor, 0.5 cm. Lung, PIEDAD wedge resection 3/1/2017: Typical carcinoid tumor, 0.8 cm. Note: Histologic sections in both specimens show nests and trabeculae of medium sized mildly pleomorphic polygonal cells with slightly eosinophilic cytoplasm, low nuclear grade.  Minimal mitotic activity (<1 per 10 HPF) is present.  No necrosis seen.  IHC is positive for CK7, TTF-1, CD56, chromogranin and synaptophysin while negative for CK20, Napsin-a, p63, CK5/6 and PAX8.  Rare cells weakly express p53.  Ki-67 is low, about 1%.  These findings support the diagnosis of typical carcinoid tumor.  It is difficult to be certain if these lesions represent primary lung neoplasm or metastatic process.  Presumption is that the tumor is of lung origin. GenPath NGS: EGFR and KRAS negative/no mutations identified. ALK negative.  ROS1 rearrangement negative.  [de-identified] : Patient establish care here in March 2024; she resumed her ongoing lanreotide injections on 4/9/2024. Patient presents today with her  prior to her second dose; discussed that she was due last week however the patient switched her appointment from last week as she was seeing her ophthalmologist. She reports occasional diarrhea that is unpredictable and lasts up to 2 days.  Loperamide has been helpful.  No significant flushing.  Breathing is satisfactory.  She has chronic left-sided weakness status post CVA.  Continues on HD 3 times weekly.  A restaging CT chest was ordered for this month, and she went for the CT scan at [facility] where she has had no prior imaging.  This showed her known multiple lung nodules in both lungs however it is difficult to interpret without comparison to prior.  She previously went to [facility] for all of her prior imaging.

## 2024-05-27 NOTE — ASSESSMENT
[FreeTextEntry1] : Malignant carcinoid tumor of lung status post PIEDAD and LLL wedge resections in March 2017 establishing her diagnosis.  She has evidence of lung metastases.  She is maintained on monthly Sandostatin LAR 30 mg IM every 4 weeks since May 2018. Recommend: - Continue Sandostatin LAR 30 mg IM q. 4 weeks.  For injection today.  -Can monitor labs on a quarterly basis - Chromogranin A is noted to be elevated, however, the value of this is not clear in the setting of renal failure.  Would not likely alter management based on this result. - Diarrhea: Likely side effect of octreotide.  Can utilize Imodium or loperamide as needed.  This occurs intermittently and appears to be manageable. - Anemia: Patient receives Epogen treatment at dialysis which she undergoes M-W-F.  No evidence of iron/B12/Folate deficiency  - Will attempt to obtain her prior PET dotatate scan from November 2023 uploaded for comparison to her current CT scan. Follow-up prior to next injection or sooner should problems arise. Checkout form provided to patient with instructions for scheduling appointments

## 2024-05-28 NOTE — HISTORY OF PRESENT ILLNESS
[FreeTextEntry1] : Patient has peripheral vascular disease and diabetes.  Her hemoglobin A1c is stable.  She last saw her medical doctor in January.  Patient is diabetic on dialysis, S/P CVA.  She is in a wheelchair and was transferred to chair without incident.  Her  is also with her.  They deny any changes in her medical conditions besides an initial consult and evaluation for a lung tumor, which she is following hematology for.  She denies any additional complaints with her feet at this time.  She does have left lower extremity edema, which she states started after her CVA.

## 2024-05-28 NOTE — PHYSICAL EXAM
[Ankle Swelling (On Exam)] : present [Ankle Swelling Bilaterally] : bilaterally  [0] : left foot posterior tibialis 0 [1+] : left foot dorsalis pedis 1+ [Varicose Veins Of Lower Extremities] : not present [FreeTextEntry3] : CFT: > 3 seconds x 10.  Temperature gradient: warm to cool.  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 Gastroc-soleus equinus present bilaterally. [FreeTextEntry4] : absent vibratory [FreeTextEntry8] : absent vibratory [FreeTextEntry1] : Monroe-Ellen testing absent at the hallux, 1st MPJ and heels, bilateral. Q9-The patient has a class finding of Q9-One Class B and 2 Class C findings

## 2024-05-28 NOTE — ASSESSMENT
[FreeTextEntry1] : Impression: Diabetes with neuropathy (E11.49).  Nail dystrophy (L60.3).  Treatment: Discussed findings and conditions with the patient and .  Continue glycemic control.  Avoid walking barefoot.  Stay with her Crocs, which she is currently wearing as they are round enough and do not apply pressure to the nails.  Moisturize skin daily.   All nails were prepped and trimmed to appropriate length using sterile nippers.  Antibiotic ointment was applied to the medial nail borders, bilateral hallux nails.  Return: 2.5 - 3 months.  With any pain, problems or concerns, patient is to contact the office.

## 2024-06-06 ENCOUNTER — NON-APPOINTMENT (OUTPATIENT)
Age: 74
End: 2024-06-06

## 2024-06-11 ENCOUNTER — APPOINTMENT (OUTPATIENT)
Dept: INFUSION THERAPY | Facility: HOSPITAL | Age: 74
End: 2024-06-11

## 2024-06-11 ENCOUNTER — APPOINTMENT (OUTPATIENT)
Dept: HEMATOLOGY ONCOLOGY | Facility: CLINIC | Age: 74
End: 2024-06-11

## 2024-06-25 ENCOUNTER — APPOINTMENT (OUTPATIENT)
Dept: HEMATOLOGY ONCOLOGY | Facility: CLINIC | Age: 74
End: 2024-06-25
Payer: MEDICARE

## 2024-06-25 ENCOUNTER — APPOINTMENT (OUTPATIENT)
Dept: INFUSION THERAPY | Facility: HOSPITAL | Age: 74
End: 2024-06-25

## 2024-06-25 DIAGNOSIS — C7A.090 MALIGNANT CARCINOID TUMOR OF THE BRONCHUS AND LUNG: ICD-10-CM

## 2024-06-25 PROCEDURE — 99443: CPT | Mod: 93

## 2024-07-09 ENCOUNTER — APPOINTMENT (OUTPATIENT)
Dept: HEMATOLOGY ONCOLOGY | Facility: CLINIC | Age: 74
End: 2024-07-09

## 2024-07-16 ENCOUNTER — OUTPATIENT (OUTPATIENT)
Dept: OUTPATIENT SERVICES | Facility: HOSPITAL | Age: 74
LOS: 1 days | Discharge: HOME | End: 2024-07-16

## 2024-07-16 DIAGNOSIS — Z95.0 PRESENCE OF CARDIAC PACEMAKER: Chronic | ICD-10-CM

## 2024-07-16 DIAGNOSIS — L98.8 OTHER SPECIFIED DISORDERS OF THE SKIN AND SUBCUTANEOUS TISSUE: Chronic | ICD-10-CM

## 2024-07-16 DIAGNOSIS — D64.9 ANEMIA, UNSPECIFIED: ICD-10-CM

## 2024-07-16 DIAGNOSIS — I77.0 ARTERIOVENOUS FISTULA, ACQUIRED: Chronic | ICD-10-CM

## 2024-07-22 ENCOUNTER — NON-APPOINTMENT (OUTPATIENT)
Age: 74
End: 2024-07-22

## 2024-07-23 ENCOUNTER — RESULT REVIEW (OUTPATIENT)
Age: 74
End: 2024-07-23

## 2024-07-23 ENCOUNTER — APPOINTMENT (OUTPATIENT)
Dept: INFUSION THERAPY | Facility: HOSPITAL | Age: 74
End: 2024-07-23

## 2024-07-23 ENCOUNTER — APPOINTMENT (OUTPATIENT)
Dept: HEMATOLOGY ONCOLOGY | Facility: CLINIC | Age: 74
End: 2024-07-23
Payer: MEDICARE

## 2024-07-23 VITALS
TEMPERATURE: 96.8 F | WEIGHT: 147.71 LBS | SYSTOLIC BLOOD PRESSURE: 115 MMHG | BODY MASS INDEX: 29.74 KG/M2 | OXYGEN SATURATION: 98 % | RESPIRATION RATE: 16 BRPM | DIASTOLIC BLOOD PRESSURE: 69 MMHG | HEART RATE: 61 BPM

## 2024-07-23 DIAGNOSIS — C7A.090 MALIGNANT CARCINOID TUMOR OF THE BRONCHUS AND LUNG: ICD-10-CM

## 2024-07-23 DIAGNOSIS — C78.00 SECONDARY MALIGNANT NEOPLASM OF UNSPECIFIED LUNG: ICD-10-CM

## 2024-07-23 LAB
BASOPHILS # BLD AUTO: 0.04 K/UL — SIGNIFICANT CHANGE UP (ref 0–0.2)
BASOPHILS NFR BLD AUTO: 0.7 % — SIGNIFICANT CHANGE UP (ref 0–2)
EOSINOPHIL # BLD AUTO: 0.18 K/UL — SIGNIFICANT CHANGE UP (ref 0–0.5)
EOSINOPHIL NFR BLD AUTO: 3.1 % — SIGNIFICANT CHANGE UP (ref 0–6)
HCT VFR BLD CALC: 35.1 % — SIGNIFICANT CHANGE UP (ref 34.5–45)
HGB BLD-MCNC: 11.4 G/DL — LOW (ref 11.5–15.5)
IMM GRANULOCYTES NFR BLD AUTO: 0.7 % — SIGNIFICANT CHANGE UP (ref 0–0.9)
LYMPHOCYTES # BLD AUTO: 1.11 K/UL — SIGNIFICANT CHANGE UP (ref 1–3.3)
LYMPHOCYTES # BLD AUTO: 19.3 % — SIGNIFICANT CHANGE UP (ref 13–44)
MCHC RBC-ENTMCNC: 29.2 PG — SIGNIFICANT CHANGE UP (ref 27–34)
MCHC RBC-ENTMCNC: 32.5 G/DL — SIGNIFICANT CHANGE UP (ref 32–36)
MCV RBC AUTO: 89.8 FL — SIGNIFICANT CHANGE UP (ref 80–100)
MONOCYTES # BLD AUTO: 0.4 K/UL — SIGNIFICANT CHANGE UP (ref 0–0.9)
MONOCYTES NFR BLD AUTO: 7 % — SIGNIFICANT CHANGE UP (ref 2–14)
NEUTROPHILS # BLD AUTO: 3.97 K/UL — SIGNIFICANT CHANGE UP (ref 1.8–7.4)
NEUTROPHILS NFR BLD AUTO: 69.2 % — SIGNIFICANT CHANGE UP (ref 43–77)
NRBC # BLD: 0 /100 WBCS — SIGNIFICANT CHANGE UP (ref 0–0)
PLATELET # BLD AUTO: 138 K/UL — LOW (ref 150–400)
RBC # BLD: 3.91 M/UL — SIGNIFICANT CHANGE UP (ref 3.8–5.2)
RBC # FLD: 15.4 % — HIGH (ref 10.3–14.5)
WBC # BLD: 5.74 K/UL — SIGNIFICANT CHANGE UP (ref 3.8–10.5)
WBC # FLD AUTO: 5.74 K/UL — SIGNIFICANT CHANGE UP (ref 3.8–10.5)

## 2024-07-23 PROCEDURE — G2211 COMPLEX E/M VISIT ADD ON: CPT

## 2024-07-23 PROCEDURE — 99214 OFFICE O/P EST MOD 30 MIN: CPT

## 2024-07-24 DIAGNOSIS — C7A.090 MALIGNANT CARCINOID TUMOR OF THE BRONCHUS AND LUNG: ICD-10-CM

## 2024-07-24 DIAGNOSIS — Z51.89 ENCOUNTER FOR OTHER SPECIFIED AFTERCARE: ICD-10-CM

## 2024-07-24 LAB
ALBUMIN SERPL ELPH-MCNC: 4.5 G/DL
ALP BLD-CCNC: 54 U/L
ALT SERPL-CCNC: 13 U/L
ANION GAP SERPL CALC-SCNC: 16 MMOL/L
AST SERPL-CCNC: 21 U/L
BILIRUB SERPL-MCNC: 0.4 MG/DL
BUN SERPL-MCNC: 25 MG/DL
CALCIUM SERPL-MCNC: 9.2 MG/DL
CHLORIDE SERPL-SCNC: 91 MMOL/L
CO2 SERPL-SCNC: 28 MMOL/L
CREAT SERPL-MCNC: 5.54 MG/DL
EGFR: 8 ML/MIN/1.73M2
GLUCOSE SERPL-MCNC: 209 MG/DL
POTASSIUM SERPL-SCNC: 4.3 MMOL/L
PROT SERPL-MCNC: 7.5 G/DL
SODIUM SERPL-SCNC: 135 MMOL/L

## 2024-07-29 LAB — CGA SERPL-MCNC: 149.9 NG/ML

## 2024-08-01 NOTE — PHYSICAL EXAM
[Capable of only limited self care, confined to bed or chair more than 50% of waking hours] : Status 3- Capable of only limited self care, confined to bed or chair more than 50% of waking hours [Normal] : affect appropriate [de-identified] : No icterus  [de-identified] : MMM O/P Clear [de-identified] : Supple  [de-identified] : Distant BS [de-identified] : S1 S2 [de-identified] : 2+ LE edema [de-identified] : No spine tenderness  [de-identified] : Left sided weakness

## 2024-08-01 NOTE — HISTORY OF PRESENT ILLNESS
[Disease: _____________________] : Disease: [unfilled] [de-identified] : 74 F from [facility] with history of ovarian cancer status post NEISHA/BSO in 2001 (versus cervical cancer which the patient reported), DM, HTN, CVA and ESRD on HD 3 times weekly.  Patient has lung nodules known since at least 2016.  Patient underwent PIEDAD and LLL wedge resections in March 2017 with pathology revealing typical carcinoid tumor, staged as T4 NX, however it was unclear if these represented primary tumors versus metastases.  Patient is being monitored with surveillance scans.  Patient was started on Sandostatin LAR injections in May 2018 which she has continued monthly.  Most recent scan from November 2023 was a PET dotatate scan showing no evidence of active disease; multiple pulmonary nodules were identified which are felt to be related to her metastatic carcinoid tumor.  Patient established care here in March 2024; she resumed her ongoing lanreotide injections on 4/9/2024.     [de-identified] : - Lung, LLL wedge resection 3/1/2017: Typical carcinoid tumor, 0.5 cm. Lung, IPEDAD wedge resection 3/1/2017: Typical carcinoid tumor, 0.8 cm. Note: Histologic sections in both specimens show nests and trabeculae of medium sized mildly pleomorphic polygonal cells with slightly eosinophilic cytoplasm, low nuclear grade.  Minimal mitotic activity (<1 per 10 HPF) is present.  No necrosis seen.  IHC is positive for CK7, TTF-1, CD56, chromogranin and synaptophysin while negative for CK20, Napsin-a, p63, CK5/6 and PAX8.  Rare cells weakly express p53.  Ki-67 is low, about 1%.  These findings support the diagnosis of typical carcinoid tumor.  It is difficult to be certain if these lesions represent primary lung neoplasm or metastatic process.  Presumption is that the tumor is of lung origin. GenPath NGS: EGFR and KRAS negative/no mutations identified. ALK negative.  ROS1 rearrangement negative.  [de-identified] : Patient established care here in March 2024; she resumed her ongoing lanreotide injections on 4/9/2024. Patient presents today prior to continued lanreotide injection. She reports sweating this morning.  No diarrhea.  She does have some loose stools. Reports that she has neck and back pains when her blood pressure is low.  Reports that she feels overall better when her BP is higher.  Breathing stable.  Denies flushing or dyspnea. Reports being on glaucoma medications. She has chronic left-sided weakness status post CVA.  Continues on HD 3 times weekly.

## 2024-08-01 NOTE — PHYSICAL EXAM
[Capable of only limited self care, confined to bed or chair more than 50% of waking hours] : Status 3- Capable of only limited self care, confined to bed or chair more than 50% of waking hours [Normal] : affect appropriate [de-identified] : No icterus  [de-identified] : MMM O/P Clear [de-identified] : Supple  [de-identified] : Distant BS [de-identified] : S1 S2 [de-identified] : 2+ LE edema [de-identified] : No spine tenderness  [de-identified] : Left sided weakness

## 2024-08-01 NOTE — RESULTS/DATA
[FreeTextEntry1] : - Dotatate PET/CT 11/2/2023: No definite PET/CT evidence of neuroendocrine tumor.  Multiple pulmonary nodules are again identified.  These are similar to the prior exam and are likely inflammatory although nonspecific.  Images Reviewed/Interpreted:  -CT Chest 5/2/24:  Patient is status post wedge resections in the left upper and left lower lobes.  Multiple nodules are noted within both lungs.  Allowing for differences in technique, multiple nodules noted within both lungs have not significantly changed when compared to previous exam.

## 2024-08-01 NOTE — ASSESSMENT
[FreeTextEntry1] : Malignant carcinoid tumor of lung status post PIEDAD and LLL wedge resections in March 2017 establishing her diagnosis.  She has evidence of lung metastases.  She is maintained on monthly Sandostatin LAR 30 mg IM every 4 weeks since May 2018. Restaging CT Chest May 2024 with stable disease.   Recommend: - Continue Sandostatin LAR 30 mg IM q. 4 weeks.  For injection today.  -Can monitor labs on a quarterly basis.   - Chromogranin A is noted to be elevated, however, the value of this is not clear in the setting of renal failure.  Would not likely alter management based on this result. - Diarrhea: Likely side effect of octreotide.  Can utilize Imodium or loperamide as needed.  This occurs intermittently and appears to be manageable. - Anemia: Patient receives Epogen treatment at dialysis which she undergoes M-W-F.  No evidence of iron/B12/Folate deficiency  - BP management as per nephrology - Patient can follow-up every other month.  Plan on obtaining her next restaging CT chest in November. Checkout form provided to patient with instructions for scheduling appointments

## 2024-08-01 NOTE — HISTORY OF PRESENT ILLNESS
[Disease: _____________________] : Disease: [unfilled] [de-identified] : 74 F from [facility] with history of ovarian cancer status post NEISHA/BSO in 2001 (versus cervical cancer which the patient reported), DM, HTN, CVA and ESRD on HD 3 times weekly.  Patient has lung nodules known since at least 2016.  Patient underwent PIEDAD and LLL wedge resections in March 2017 with pathology revealing typical carcinoid tumor, staged as T4 NX, however it was unclear if these represented primary tumors versus metastases.  Patient is being monitored with surveillance scans.  Patient was started on Sandostatin LAR injections in May 2018 which she has continued monthly.  Most recent scan from November 2023 was a PET dotatate scan showing no evidence of active disease; multiple pulmonary nodules were identified which are felt to be related to her metastatic carcinoid tumor.  Patient established care here in March 2024; she resumed her ongoing lanreotide injections on 4/9/2024.     [de-identified] : - Lung, LLL wedge resection 3/1/2017: Typical carcinoid tumor, 0.5 cm. Lung, PIEDAD wedge resection 3/1/2017: Typical carcinoid tumor, 0.8 cm. Note: Histologic sections in both specimens show nests and trabeculae of medium sized mildly pleomorphic polygonal cells with slightly eosinophilic cytoplasm, low nuclear grade.  Minimal mitotic activity (<1 per 10 HPF) is present.  No necrosis seen.  IHC is positive for CK7, TTF-1, CD56, chromogranin and synaptophysin while negative for CK20, Napsin-a, p63, CK5/6 and PAX8.  Rare cells weakly express p53.  Ki-67 is low, about 1%.  These findings support the diagnosis of typical carcinoid tumor.  It is difficult to be certain if these lesions represent primary lung neoplasm or metastatic process.  Presumption is that the tumor is of lung origin. GenPath NGS: EGFR and KRAS negative/no mutations identified. ALK negative.  ROS1 rearrangement negative.  [de-identified] : Patient established care here in March 2024; she resumed her ongoing lanreotide injections on 4/9/2024. Patient presents today prior to continued lanreotide injection. She reports sweating this morning.  No diarrhea.  She does have some loose stools. Reports that she has neck and back pains when her blood pressure is low.  Reports that she feels overall better when her BP is higher.  Breathing stable.  Denies flushing or dyspnea. Reports being on glaucoma medications. She has chronic left-sided weakness status post CVA.  Continues on HD 3 times weekly.

## 2024-08-01 NOTE — ASSESSMENT
[FreeTextEntry1] : Malignant carcinoid tumor of lung status post PIEDAD and LLL wedge resections in March 2017 establishing her diagnosis.  She has evidence of lung metastases.  She is maintained on monthly Sandostatin LAR 30 mg IM every 4 weeks since May 2018. Restaging CT Chest May 2024 with stable disease.   Recommend: - Continue Sandostatin LAR 30 mg IM q. 4 weeks.  For injection today.  -Can monitor labs on a quarterly basis.   - Chromogranin A is noted to be elevated, however, the value of this is not clear in the setting of renal failure.  Would not likely alter management based on this result. - Diarrhea: Likely side effect of octreotide.  Can utilize Imodium or loperamide as needed.  This occurs intermittently and appears to be manageable. - Anemia: Patient receives Epogen treatment at dialysis which she undergoes M-W-F.  No evidence of iron/B12/Folate deficiency  - BP management as per nephrology - Patient can follow-up every other month.  Plan on obtaining her next restaging CT chest in November. Checkout form provided to patient with instructions for scheduling appointments    24 HOUR EVENTS:  - Failed SBT  - Vascular surgery planning for LUE angiogram today, possible thrombectomy  - Febrile to tmax 100.6, tylenol administered  - LUE CTA unable to be performed because patient's only peripheral IV is in extremity being scanned    SUBJECTIVE/ROS:  [ ] A ten-point review of systems was otherwise negative except as noted.  [x] Due to altered mental status/intubation, subjective information were not able to be obtained from the patient. History was obtained, to the extent possible, from review of the chart and collateral sources of information.      NEURO  RASS: 0   Exam: awake, alert, follows commands  Meds: acetaminophen   IVPB .. 1000 milliGRAM(s) IV Intermittent every 6 hours  dexMEDEtomidine Infusion 0.5 MICROgram(s)/kG/Hr IV Continuous <Continuous>  HYDROmorphone  Injectable 0.5 milliGRAM(s) IV Push every 3 hours PRN Severe Pain (7 - 10)  [x] Adequacy of sedation and pain control has been assessed and adjusted    RESPIRATORY  RR: 24 (10-27-21 @ 03:45) (0 - 31)  SpO2: 99% (10-27-21 @ 03:45) (77% - 100%)  Exam: unlabored  Mechanical Ventilation: Mode: AC/ CMV (Assist Control/ Continuous Mandatory Ventilation), RR (machine): 22, RR (patient): 24, TV (machine): 320, FiO2: 30, PEEP: 5, ITime: 0.9, MAP: 10, PIP: 19    CARDIOVASCULAR  HR: 59 (10-27-21 @ 03:45) (49 - 82)  BP: 102/51 (10-27-21 @ 03:45) (80/42 - 145/64)  BP(mean): 74 (10-27-21 @ 03:45) (48 - 94)  VBG - ( 27 Oct 2021 02:09 )  pH: 7.37  /  pCO2: 42    /  pO2: 52    / HCO3: 24    / Base Excess: -1.0  /  SaO2: 85.3   Lactate: 1.8      Exam: regular rate and rhythm  Cardiac Rhythm: sinus  Perfusion     [x]Adequate   [ ]Inadequate  Mentation   [x]Normal       [ ]Reduced  Extremities  [x]Warm         [ ]Cool  Volume Status [ ]Hypervolemic [x]Euvolemic [ ]Hypovolemic  Meds: norepinephrine Infusion 0.05 MICROgram(s)/kG/Min IV Continuous <Continuous>    GI/NUTRITION  Exam: soft, nontender, nondistended, incision C/D/I  Diet: NPO  Meds: pantoprazole  Injectable 40 milliGRAM(s) IV Push every 12 hours    GENITOURINARY  I&O's Detail    10-25 @ 07:01  -  10-26 @ 07:00  --------------------------------------------------------  IN:    Dexmedetomidine: 128.7 mL    dextrose 5% + sodium chloride 0.9%: 570 mL    Heparin: 151 mL    Heparin: 35 mL    IV PiggyBack: 100 mL    IV PiggyBack: 200 mL    Norepinephrine: 38.4 mL  Total IN: 1223.1 mL    OUT:    Nasogastric/Oral tube (mL): 50 mL    Other (mL): 2000 mL  Total OUT: 2050 mL    Total NET: -826.9 mL      10-26 @ 07:01  -  10-27 @ 06:55  --------------------------------------------------------  IN:    Dexmedetomidine: 16.8 mL    Dexmedetomidine: 75.3 mL    Heparin: 147 mL    IV PiggyBack: 100 mL    Norepinephrine: 104.3 mL  Total IN: 443.4 mL    OUT:    Voided (mL): 0 mL  Total OUT: 0 mL    Total NET: 443.4 mL    10-27    137  |  98  |  19  ----------------------------<  109<H>  4.4   |  21<L>  |  3.62<H>    Ca    8.0<L>      27 Oct 2021 02:16  Phos  4.6     10-27  Mg     2.4     10-27    TPro  5.3<L>  /  Alb  1.8<L>  /  TBili  1.7<H>  /  DBili  x   /  AST  39  /  ALT  52<H>  /  AlkPhos  153<H>  10-27    HEMATOLOGIC:  Meds: heparin  Infusion 700 Unit(s)/Hr IV Continuous <Continuous>    [x] VTE Prophylaxis                        9.2    30.90 )-----------( 293      ( 27 Oct 2021 02:16 )             30.1     PT/INR - ( 27 Oct 2021 02:16 )   PT: 50.4 sec;   INR: 4.51 ratio         PTT - ( 27 Oct 2021 02:16 )  PTT:134.0 sec  Transfusion     [ ] PRBC   [ ] Platelets   [ ] FFP   [ ] Cryoprecipitate    INFECTIOUS DISEASES  WBC Count: 30.90 K/uL (10-27 @ 02:16)  WBC Count: 27.25 K/uL (10-26 @ 18:01)    RECENT CULTURES:  Specimen Source: .Blood Blood-Peripheral  Date/Time: 10-22 @ 09:01  Culture Results:   No growth to date.  Gram Stain: --  Organism: --    Meds: piperacillin/tazobactam IVPB.. 3.375 Gram(s) IV Intermittent every 12 hours    ENDOCRINE:  No active issues    ACCESS DEVICES:  [x] Peripheral IV  [x] Central Venous Line	[ ] R	[x] L	[x] IJ	[ ] Fem	[ ] SC	Placed:   [ ] Arterial Line		[ ] R	[ ] L	[ ] Fem	[ ] Rad	[ ] Ax	Placed:   [ ] PICC:					[ ] Mediport  [ ] Urinary Catheter, Date Placed:   [x] Necessity of urinary, arterial, and venous catheters discussed    OTHER MEDICATIONS:  chlorhexidine 0.12% Liquid 15 milliLiter(s) Oral Mucosa every 12 hours  chlorhexidine 2% Cloths 1 Application(s) Topical <User Schedule>    CODE STATUS: Full code

## 2024-08-20 ENCOUNTER — APPOINTMENT (OUTPATIENT)
Dept: INFUSION THERAPY | Facility: HOSPITAL | Age: 74
End: 2024-08-20

## 2024-09-06 ENCOUNTER — APPOINTMENT (OUTPATIENT)
Dept: CARDIOLOGY | Facility: CLINIC | Age: 74
End: 2024-09-06
Payer: MEDICARE

## 2024-09-06 VITALS
TEMPERATURE: 96.4 F | OXYGEN SATURATION: 98 % | DIASTOLIC BLOOD PRESSURE: 66 MMHG | WEIGHT: 149.91 LBS | SYSTOLIC BLOOD PRESSURE: 130 MMHG | BODY MASS INDEX: 30.19 KG/M2 | HEART RATE: 61 BPM

## 2024-09-06 DIAGNOSIS — I35.8 OTHER NONRHEUMATIC AORTIC VALVE DISORDERS: ICD-10-CM

## 2024-09-06 DIAGNOSIS — Z01.810 ENCOUNTER FOR PREPROCEDURAL CARDIOVASCULAR EXAMINATION: ICD-10-CM

## 2024-09-06 DIAGNOSIS — Z86.19 PERSONAL HISTORY OF OTHER INFECTIOUS AND PARASITIC DISEASES: ICD-10-CM

## 2024-09-06 DIAGNOSIS — I10 ESSENTIAL (PRIMARY) HYPERTENSION: ICD-10-CM

## 2024-09-06 DIAGNOSIS — Z99.2 END STAGE RENAL DISEASE: ICD-10-CM

## 2024-09-06 DIAGNOSIS — D64.9 ANEMIA, UNSPECIFIED: ICD-10-CM

## 2024-09-06 DIAGNOSIS — E11.49 TYPE 2 DIABETES MELLITUS WITH OTHER DIABETIC NEUROLOGICAL COMPLICATION: ICD-10-CM

## 2024-09-06 DIAGNOSIS — N18.6 END STAGE RENAL DISEASE: ICD-10-CM

## 2024-09-06 DIAGNOSIS — R06.02 SHORTNESS OF BREATH: ICD-10-CM

## 2024-09-06 DIAGNOSIS — C7A.090 MALIGNANT CARCINOID TUMOR OF THE BRONCHUS AND LUNG: ICD-10-CM

## 2024-09-06 DIAGNOSIS — R07.9 CHEST PAIN, UNSPECIFIED: ICD-10-CM

## 2024-09-06 DIAGNOSIS — I70.90 UNSPECIFIED ATHEROSCLEROSIS: ICD-10-CM

## 2024-09-06 PROCEDURE — 93000 ELECTROCARDIOGRAM COMPLETE: CPT

## 2024-09-06 PROCEDURE — 93010 ELECTROCARDIOGRAM REPORT: CPT

## 2024-09-06 PROCEDURE — G2211 COMPLEX E/M VISIT ADD ON: CPT

## 2024-09-06 PROCEDURE — 99214 OFFICE O/P EST MOD 30 MIN: CPT

## 2024-09-06 NOTE — PHYSICAL EXAM
[Well Nourished] : well nourished [No Acute Distress] : no acute distress [No Xanthelasma] : no xanthelasma [Normal S1, S2] : normal S1, S2 [No Rub] : no rub [No Gallop] : no gallop [Good Air Entry] : good air entry [No Respiratory Distress] : no respiratory distress  [Abnormal Gait] : abnormal gait [No Edema] : no edema [Normal Speech] : normal speech [Alert and Oriented] : alert and oriented [Normal memory] : normal memory [Murmur] : murmur [de-identified] : transmitted flow murmur [de-identified] : In wheelchair [de-identified] : hearing loss [de-identified] : left hemiparesis

## 2024-09-06 NOTE — ASSESSMENT
[FreeTextEntry1] : -------------------------------------------------  74 year old woman with pulmonary carcinoid tumor s/p wedge resection, ESRD on HD via LUE AV fistula (T/R/S), CVA with residual left hemiparesis, hypertension, dual chamber pacemaker placed for syncope, and insulin dependent diabetes.  # Blood pressure difficult to control in setting of fluid shifts. Given recent difficulties with symptomatic low BP, recommended the following at this time: - hold nifedipine 90 ER for now - continue carvedilol 6.25 BID consistently - hold Hydralazine for now - if BP is still too low in one week, call me to adjust carvedilol dose - if BP is too high in one week, resume nifedipine 90 ER daily - if BP is normal - continue carvedilol twice daily on non-HD days and once daily after HD (reports this is how she has always taken it)  Her pacemaker is followed at [facility]. I asked her to have the interrogation report forwarded for our records.   # Non-obstructive CAD - mild luminal irregularities on [facility] 5/2022, possible microvascular ischemia - continue atorvastatin 20 - was intolerant to aspirin in the past - continue clopidogrel  # Neuroendocrine tumor: lung involvement but no significant hepatic disease - mild aortic valve thickening noted previously; normal TV, mild-moderate TR (likely from pacing wire) - biomarkers checked 4/2022 - stable - continue somatostatin analog - repeat echo to monitor aortic valve (ordered)  # Non-cardiac chest pain, suspect related to spastic paralysis or microvascular disease - nuclear stress test unchanged from prior  - no obstructive CAD on cardiac cath - optimize anemia and continue BP and HR control with BB  Above recommendations were discussed with the patient and her . She asked a series of questions, all questions answered to the best of my ability and their apparent satisfaction.  Follow up in 6 months.

## 2024-09-06 NOTE — HISTORY OF PRESENT ILLNESS
[FreeTextEntry1] : Interval History: Lately her blood pressure has been too low and she has required IV NS after HD. She reports no increase in diarrhea or GI symptoms. She stopped taking nifedipine one week ago and is mostly taking carvedilol and hydralazine when her blood pressure is > 120 systolic. She reports increased chest pain or shortness of breath.    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  She reports dyspnea and more labored breathing prior to hemodialysis which is stable and unchanged for several years. She denies any chest pain, palpitations, abdominal distension, lower extremity edema, dizziness/lightheadedness. Carcinoid symptoms are under control on monthly somatostatin therapy.  She is not aware of any history of cardiovascular disease and reports a cardiac catheterization done at [facility] in 2019 showed no obstructive disease. Her last echocardiogram in  showed normal LV systolic function; tricuspid valve was not well visualized on that study. Most recent chromogranin A 165 (stable).  2022: Reports increased shortness of breath over the past 6 months or so. No chest pain, pressure, or palpitations. Remains on monthly octreotide with stable disease. No carcinoid symptoms.  2022: Feels well. Has not been taking amlodipine. Taking carvedilol and hydralazine (twice daily). No further chest pain and shortness of breath improved.  Chromogranin A levels increased slightly, so back to monthly octreotide.  2023: BP has been low after dialysis, so is not taking hydralazine or carvedilol in PM after HD. No chest pain or shortness of breath. Is having cataract extraction on  and requires cardiac clearance. Continues on monthly octreotide with Dr. Campo.  Sep 8, 2023: Neuroendocrine tumor stable per patient on somatostatin analog - no recent changes. She continues to experience left sided chest "spasm" during hemodialysis, which improves with IV saline or hot compresses. BP has been better controlled recently. She never started nifedipine, but takes amlodipine 10 mg daily, with carvedilol and hydralazine on non-dialysis days. She has an appointment for pacemaker interrogation and device follow up with Dr. Crowley at [facility] in November.  3/8/2024: Her blood pressure has been less labile after medication adjustment. She remains on long acting somatostatin analog and reports stable disease. She reports having increased breathlessness, especially when taking or exerting herself and is concerned about CAD. She reports not having chest pain, but difficulty breathing seems worse to her now than before. She does not have edema.   Cardiovascular Summary: ---------------------------------------------- EC2024: sinus 63 bpm w sinus arrhythmia, 1st degree AV block, RBBB 3/8/2024: sinus 1st degree AV block, RBBB 2023: sinus 73 bpm, 1st degree AV block, RBBB,  3/7/2023: A-paced with prolonged AV delay, underlying sinus with 1st degree AV block, RBBB. 2022: NSR 68 bpm, 1st degree AV block, RBBB 2022: NSR 64 bpm 1st degree AV block, RBBB 2021: atrial pacemaker, RBBB.  ---------------------------------------------- Echo: 2023: EF 61 %, mild TR, mild MR, calcified AV, no apparent carcinoid heart disease 3/30/2023: EF 63 %, mild MR, mild AR, calcified aortic valve 2022: EF 65%, mild to moderate TR. Normal appearing tricuspid valve 2021: LVEF 66%, GLS -15.5, normal tricuspid valve, normal pulmonic valve, mod pulmonary HTN  ---------------------------------------------- MPI stress: 3/28/2024: moderate inferolateral ischemia (no significant change from prior) 2022: moderate inferior ischemia ---------------------------------------------- Cath: 2022: LM minor luminal irregularities, LAD mild athero, LCx 30 % ostial disease, dRCA 20 %

## 2024-09-06 NOTE — REVIEW OF SYSTEMS
[SOB] : shortness of breath [Dyspnea on exertion] : dyspnea during exertion [Feeling Fatigued] : feeling fatigued [Hearing Loss] : hearing loss [Dizziness] : dizziness [Weight Gain (___ Lbs)] : no recent weight gain [Chest Discomfort] : no chest discomfort [Lower Ext Edema] : no extremity edema [Change in Appetite] : no change in appetite [Change In The Stool] : no change in stool [Diarrhea] : diarrhea [Easy Bruising] : no tendency for easy bruising [de-identified] : receiving treatment for anemia

## 2024-09-06 NOTE — REASON FOR VISIT
76 Y/o  female patient with PMHx for T1DM, HTN, SHANTE on CPAP, presented  to the ED for a fall, admitted for placement.    ·  Problem: Patella fracture.  s/p mechanical fall  - XR showed 5 cm distracted mid body patella transverse fracture.  Fracture fragments inferior to the distal patella fracture segment.  - CT scans shows Acute comminuted distracted transverse fracture of the patella at its midportion as above.  - Reactive leukocytosis. Improved   - Pain regimen in place  - Fall risk protocol  - knee immobilizer in place. Ortho recommended no surgical intervention   - WBAT  - Ice and elevate     PT recommended [facility].     Totals discharge time spent 50 minutes including medication reconciliation, counseling and education    [Spouse] : spouse [FreeTextEntry3] : [facility] (Dr. Washington) [FreeTextEntry1] : ---------------- UMAANA HENDERSON is a 74 year old woman with pulmonary carcinoid tumor s/p wedge resection, ESRD on HD via LUE AV fistula (T/R/S), CVA with residual left hemiparesis, hypertension, dual chamber pacemaker placed for syncope, and insulin dependent diabetes who returns for follow up. 74 Y/o  female patient with PMHx for T1DM, HTN, SHANTE on CPAP, presented  to the ED for a fall, admitted for placement.    ·  Problem: Patella fracture.  s/p mechanical fall  - XR showed 5 cm distracted mid body patella transverse fracture.  Fracture fragments inferior to the distal patella fracture segment.  - CT scans shows Acute comminuted distracted transverse fracture of the patella at its midportion as above.  - Reactive leukocytosis. Improved   - Pain regimen in place  - Fall risk protocol  - knee immobilizer in place. Ortho recommended no surgical intervention   - WBAT  - Ice and elevate     PT recommended [facility].     Totals discharge time spent 50 minutes including medication reconciliation, counseling and education     Addendum: Patient has been refusing CPAP here in the hospital though it is recommended. Patient prefers to use her home CPAP machine. advised if someone can bring that here or at [facility]

## 2024-09-09 ENCOUNTER — OUTPATIENT (OUTPATIENT)
Dept: OUTPATIENT SERVICES | Facility: HOSPITAL | Age: 74
LOS: 1 days | Discharge: HOME | End: 2024-09-09

## 2024-09-09 DIAGNOSIS — I77.0 ARTERIOVENOUS FISTULA, ACQUIRED: Chronic | ICD-10-CM

## 2024-09-09 DIAGNOSIS — Z95.0 PRESENCE OF CARDIAC PACEMAKER: Chronic | ICD-10-CM

## 2024-09-09 DIAGNOSIS — D64.9 ANEMIA, UNSPECIFIED: ICD-10-CM

## 2024-09-17 ENCOUNTER — APPOINTMENT (OUTPATIENT)
Dept: HEMATOLOGY ONCOLOGY | Facility: CLINIC | Age: 74
End: 2024-09-17
Payer: MEDICARE

## 2024-09-17 ENCOUNTER — APPOINTMENT (OUTPATIENT)
Dept: INFUSION THERAPY | Facility: HOSPITAL | Age: 74
End: 2024-09-17

## 2024-09-17 VITALS
SYSTOLIC BLOOD PRESSURE: 135 MMHG | BODY MASS INDEX: 30.45 KG/M2 | HEART RATE: 65 BPM | OXYGEN SATURATION: 96 % | WEIGHT: 151.23 LBS | RESPIRATION RATE: 16 BRPM | TEMPERATURE: 96.4 F | DIASTOLIC BLOOD PRESSURE: 68 MMHG

## 2024-09-17 PROCEDURE — 99214 OFFICE O/P EST MOD 30 MIN: CPT

## 2024-09-17 NOTE — ASSESSMENT
[FreeTextEntry1] : Malignant carcinoid tumor of lung status post PIEDAD and LLL wedge resections in March 2017 establishing her diagnosis.  She has evidence of lung metastases.  She is maintained on monthly Sandostatin LAR 30 mg IM every 4 weeks since May 2018. Restaging CT Chest May 2024 with stable disease.   Recommend: - Continue Sandostatin LAR 30 mg IM q. 4 weeks.  For injection today.  -Can monitor labs on a quarterly basis.   - Chromogranin A is noted to be elevated, however, the value of this is not clear in the setting of renal failure.  Would not likely alter management based on this result. - Diarrhea: No current complaints. Likely side effect of octreotide.  Can utilize Imodium or loperamide as needed.   - Anemia: Patient receives Epogen treatment at dialysis which she undergoes M-W-F.  No evidence of iron/B12/Folate deficiency  - BP management as per nephrology - Patient can follow-up every other month.   Plan on obtaining her next restaging CT chest in November prior to her appointment with Dr. Henderson. Non-contrast Chest CT order placed to be done on or about 11/4/24 Checkout form provided to patient with instructions for scheduling appointments

## 2024-09-17 NOTE — HISTORY OF PRESENT ILLNESS
[Disease: _____________________] : Disease: [unfilled] [de-identified] : 74 F from [facility] with history of ovarian cancer status post NEISHA/BSO in 2001 (versus cervical cancer which the patient reported), DM, HTN, CVA and ESRD on HD 3 times weekly.  Patient has lung nodules known since at least 2016.  Patient underwent PIEDAD and LLL wedge resections in March 2017 with pathology revealing typical carcinoid tumor, staged as T4 NX, however it was unclear if these represented primary tumors versus metastases.  Patient is being monitored with surveillance scans.  Patient was started on Sandostatin LAR injections in May 2018 which she has continued monthly.  Most recent scan from November 2023 was a PET dotatate scan showing no evidence of active disease; multiple pulmonary nodules were identified which are felt to be related to her metastatic carcinoid tumor.  Patient established care here in March 2024; she resumed her ongoing lanreotide injections on 4/9/2024.     [de-identified] : - Lung, LLL wedge resection 3/1/2017: Typical carcinoid tumor, 0.5 cm. Lung, PIEDAD wedge resection 3/1/2017: Typical carcinoid tumor, 0.8 cm. Note: Histologic sections in both specimens show nests and trabeculae of medium sized mildly pleomorphic polygonal cells with slightly eosinophilic cytoplasm, low nuclear grade.  Minimal mitotic activity (<1 per 10 HPF) is present.  No necrosis seen.  IHC is positive for CK7, TTF-1, CD56, chromogranin and synaptophysin while negative for CK20, Napsin-a, p63, CK5/6 and PAX8.  Rare cells weakly express p53.  Ki-67 is low, about 1%.  These findings support the diagnosis of typical carcinoid tumor.  It is difficult to be certain if these lesions represent primary lung neoplasm or metastatic process.  Presumption is that the tumor is of lung origin. [facility] NGS: EGFR and KRAS negative/no mutations identified. ALK negative.  ROS1 rearrangement negative.  [de-identified] : Patient established care here in March 2024; she resumed her ongoing lanreotide injections on 4/9/2024. Patient presents today prior to continued lanreotide injection. Denies any flushing sweating and diarrhea Pt questioning results of blood work done last visit.  She has chronic left-sided weakness status post CVA.   Continues on HD 3 times weekly (MWF). No new complaints

## 2024-09-17 NOTE — PHYSICAL EXAM
[Capable of only limited self care, confined to bed or chair more than 50% of waking hours] : Status 3- Capable of only limited self care, confined to bed or chair more than 50% of waking hours [Normal] : affect appropriate [de-identified] : No icterus  [de-identified] : MMM O/P Clear [de-identified] : Supple  [de-identified] : Distant BS [de-identified] : S1 S2 [de-identified] : 1+ LE edema LUE AV Fistula for HD  [de-identified] : No spine tenderness  [de-identified] : Left sided weakness

## 2024-09-18 DIAGNOSIS — C7A.090 MALIGNANT CARCINOID TUMOR OF THE BRONCHUS AND LUNG: ICD-10-CM

## 2024-09-18 DIAGNOSIS — Z51.89 ENCOUNTER FOR OTHER SPECIFIED AFTERCARE: ICD-10-CM

## 2024-09-19 ENCOUNTER — RESULT REVIEW (OUTPATIENT)
Age: 74
End: 2024-09-19

## 2024-09-19 ENCOUNTER — OUTPATIENT (OUTPATIENT)
Dept: OUTPATIENT SERVICES | Facility: HOSPITAL | Age: 74
LOS: 1 days | End: 2024-09-19
Payer: MEDICARE

## 2024-09-19 ENCOUNTER — APPOINTMENT (OUTPATIENT)
Dept: CV DIAGNOSITCS | Facility: HOSPITAL | Age: 74
End: 2024-09-19

## 2024-09-19 DIAGNOSIS — L98.8 OTHER SPECIFIED DISORDERS OF THE SKIN AND SUBCUTANEOUS TISSUE: Chronic | ICD-10-CM

## 2024-09-19 DIAGNOSIS — Z95.0 PRESENCE OF CARDIAC PACEMAKER: Chronic | ICD-10-CM

## 2024-09-19 DIAGNOSIS — C7A.090 MALIGNANT CARCINOID TUMOR OF THE BRONCHUS AND LUNG: ICD-10-CM

## 2024-09-19 DIAGNOSIS — I77.0 ARTERIOVENOUS FISTULA, ACQUIRED: Chronic | ICD-10-CM

## 2024-09-19 DIAGNOSIS — I35.8 OTHER NONRHEUMATIC AORTIC VALVE DISORDERS: ICD-10-CM

## 2024-09-19 PROCEDURE — 93306 TTE W/DOPPLER COMPLETE: CPT | Mod: 26

## 2024-10-15 ENCOUNTER — APPOINTMENT (OUTPATIENT)
Dept: INFUSION THERAPY | Facility: HOSPITAL | Age: 74
End: 2024-10-15

## 2024-11-05 ENCOUNTER — APPOINTMENT (OUTPATIENT)
Dept: CT IMAGING | Facility: IMAGING CENTER | Age: 74
End: 2024-11-05

## 2024-11-05 ENCOUNTER — OUTPATIENT (OUTPATIENT)
Dept: OUTPATIENT SERVICES | Facility: HOSPITAL | Age: 74
LOS: 1 days | End: 2024-11-05
Payer: MEDICARE

## 2024-11-05 DIAGNOSIS — L98.8 OTHER SPECIFIED DISORDERS OF THE SKIN AND SUBCUTANEOUS TISSUE: Chronic | ICD-10-CM

## 2024-11-05 DIAGNOSIS — I77.0 ARTERIOVENOUS FISTULA, ACQUIRED: Chronic | ICD-10-CM

## 2024-11-05 DIAGNOSIS — C78.00 SECONDARY MALIGNANT NEOPLASM OF UNSPECIFIED LUNG: ICD-10-CM

## 2024-11-05 DIAGNOSIS — Z95.0 PRESENCE OF CARDIAC PACEMAKER: Chronic | ICD-10-CM

## 2024-11-05 PROCEDURE — 71250 CT THORAX DX C-: CPT | Mod: 26,MH

## 2024-11-07 ENCOUNTER — OUTPATIENT (OUTPATIENT)
Dept: OUTPATIENT SERVICES | Facility: HOSPITAL | Age: 74
LOS: 1 days | Discharge: HOME | End: 2024-11-07

## 2024-11-07 DIAGNOSIS — Z95.0 PRESENCE OF CARDIAC PACEMAKER: Chronic | ICD-10-CM

## 2024-11-07 DIAGNOSIS — I77.0 ARTERIOVENOUS FISTULA, ACQUIRED: Chronic | ICD-10-CM

## 2024-11-07 DIAGNOSIS — L98.8 OTHER SPECIFIED DISORDERS OF THE SKIN AND SUBCUTANEOUS TISSUE: Chronic | ICD-10-CM

## 2024-11-07 DIAGNOSIS — D64.9 ANEMIA, UNSPECIFIED: ICD-10-CM

## 2024-11-12 ENCOUNTER — APPOINTMENT (OUTPATIENT)
Dept: HEMATOLOGY ONCOLOGY | Facility: CLINIC | Age: 74
End: 2024-11-12
Payer: MEDICARE

## 2024-11-12 ENCOUNTER — APPOINTMENT (OUTPATIENT)
Dept: INFUSION THERAPY | Facility: HOSPITAL | Age: 74
End: 2024-11-12

## 2024-11-12 ENCOUNTER — RESULT REVIEW (OUTPATIENT)
Age: 74
End: 2024-11-12

## 2024-11-12 VITALS
HEART RATE: 69 BPM | TEMPERATURE: 96.6 F | SYSTOLIC BLOOD PRESSURE: 165 MMHG | DIASTOLIC BLOOD PRESSURE: 75 MMHG | OXYGEN SATURATION: 97 % | RESPIRATION RATE: 16 BRPM

## 2024-11-12 DIAGNOSIS — C78.00 SECONDARY MALIGNANT NEOPLASM OF UNSPECIFIED LUNG: ICD-10-CM

## 2024-11-12 DIAGNOSIS — C7A.090 MALIGNANT CARCINOID TUMOR OF THE BRONCHUS AND LUNG: ICD-10-CM

## 2024-11-12 LAB
BASOPHILS # BLD AUTO: 0.04 K/UL — SIGNIFICANT CHANGE UP (ref 0–0.2)
BASOPHILS NFR BLD AUTO: 0.7 % — SIGNIFICANT CHANGE UP (ref 0–2)
EOSINOPHIL # BLD AUTO: 0.21 K/UL — SIGNIFICANT CHANGE UP (ref 0–0.5)
EOSINOPHIL NFR BLD AUTO: 3.6 % — SIGNIFICANT CHANGE UP (ref 0–6)
HCT VFR BLD CALC: 33.3 % — LOW (ref 34.5–45)
HGB BLD-MCNC: 10.8 G/DL — LOW (ref 11.5–15.5)
IMM GRANULOCYTES NFR BLD AUTO: 0.7 % — SIGNIFICANT CHANGE UP (ref 0–0.9)
LYMPHOCYTES # BLD AUTO: 1.18 K/UL — SIGNIFICANT CHANGE UP (ref 1–3.3)
LYMPHOCYTES # BLD AUTO: 20.4 % — SIGNIFICANT CHANGE UP (ref 13–44)
MCHC RBC-ENTMCNC: 30.6 PG — SIGNIFICANT CHANGE UP (ref 27–34)
MCHC RBC-ENTMCNC: 32.4 G/DL — SIGNIFICANT CHANGE UP (ref 32–36)
MCV RBC AUTO: 94.3 FL — SIGNIFICANT CHANGE UP (ref 80–100)
MONOCYTES # BLD AUTO: 0.48 K/UL — SIGNIFICANT CHANGE UP (ref 0–0.9)
MONOCYTES NFR BLD AUTO: 8.3 % — SIGNIFICANT CHANGE UP (ref 2–14)
NEUTROPHILS # BLD AUTO: 3.84 K/UL — SIGNIFICANT CHANGE UP (ref 1.8–7.4)
NEUTROPHILS NFR BLD AUTO: 66.3 % — SIGNIFICANT CHANGE UP (ref 43–77)
NRBC # BLD: 0 /100 WBCS — SIGNIFICANT CHANGE UP (ref 0–0)
PLATELET # BLD AUTO: 121 K/UL — LOW (ref 150–400)
RBC # BLD: 3.53 M/UL — LOW (ref 3.8–5.2)
RBC # FLD: 12.7 % — SIGNIFICANT CHANGE UP (ref 10.3–14.5)
WBC # BLD: 5.79 K/UL — SIGNIFICANT CHANGE UP (ref 3.8–10.5)
WBC # FLD AUTO: 5.79 K/UL — SIGNIFICANT CHANGE UP (ref 3.8–10.5)

## 2024-11-12 PROCEDURE — G2211 COMPLEX E/M VISIT ADD ON: CPT

## 2024-11-12 PROCEDURE — 99214 OFFICE O/P EST MOD 30 MIN: CPT

## 2024-11-13 DIAGNOSIS — C7A.090 MALIGNANT CARCINOID TUMOR OF THE BRONCHUS AND LUNG: ICD-10-CM

## 2024-11-14 LAB
ALBUMIN SERPL ELPH-MCNC: 4.5 G/DL
ALP BLD-CCNC: 159 U/L
ALT SERPL-CCNC: 12 U/L
ANION GAP SERPL CALC-SCNC: 17 MMOL/L
AST SERPL-CCNC: 16 U/L
BILIRUB SERPL-MCNC: 0.4 MG/DL
BUN SERPL-MCNC: 37 MG/DL
CALCIUM SERPL-MCNC: 9.9 MG/DL
CHLORIDE SERPL-SCNC: 95 MMOL/L
CO2 SERPL-SCNC: 27 MMOL/L
CREAT SERPL-MCNC: 7.12 MG/DL
EGFR: 6 ML/MIN/1.73M2
GLUCOSE SERPL-MCNC: 182 MG/DL
POTASSIUM SERPL-SCNC: 5.2 MMOL/L
PROT SERPL-MCNC: 7.8 G/DL
SODIUM SERPL-SCNC: 138 MMOL/L

## 2024-11-15 ENCOUNTER — NON-APPOINTMENT (OUTPATIENT)
Age: 74
End: 2024-11-15

## 2024-11-16 LAB — CGA SERPL-MCNC: 207.1 NG/ML

## 2024-11-20 PROCEDURE — 71250 CT THORAX DX C-: CPT

## 2024-12-10 ENCOUNTER — APPOINTMENT (OUTPATIENT)
Dept: INFUSION THERAPY | Facility: HOSPITAL | Age: 74
End: 2024-12-10

## 2024-12-11 DIAGNOSIS — Z51.89 ENCOUNTER FOR OTHER SPECIFIED AFTERCARE: ICD-10-CM

## 2024-12-31 ENCOUNTER — OUTPATIENT (OUTPATIENT)
Dept: OUTPATIENT SERVICES | Facility: HOSPITAL | Age: 74
LOS: 1 days | Discharge: HOME | End: 2024-12-31

## 2024-12-31 DIAGNOSIS — I77.0 ARTERIOVENOUS FISTULA, ACQUIRED: Chronic | ICD-10-CM

## 2024-12-31 DIAGNOSIS — D64.9 ANEMIA, UNSPECIFIED: ICD-10-CM

## 2024-12-31 DIAGNOSIS — L98.8 OTHER SPECIFIED DISORDERS OF THE SKIN AND SUBCUTANEOUS TISSUE: Chronic | ICD-10-CM

## 2024-12-31 DIAGNOSIS — Z95.0 PRESENCE OF CARDIAC PACEMAKER: Chronic | ICD-10-CM

## 2025-01-07 ENCOUNTER — APPOINTMENT (OUTPATIENT)
Dept: INFUSION THERAPY | Facility: HOSPITAL | Age: 75
End: 2025-01-07

## 2025-01-07 ENCOUNTER — APPOINTMENT (OUTPATIENT)
Dept: HEMATOLOGY ONCOLOGY | Facility: CLINIC | Age: 75
End: 2025-01-07
Payer: MEDICARE

## 2025-01-07 VITALS
DIASTOLIC BLOOD PRESSURE: 73 MMHG | RESPIRATION RATE: 16 BRPM | OXYGEN SATURATION: 97 % | HEART RATE: 64 BPM | BODY MASS INDEX: 31.07 KG/M2 | TEMPERATURE: 97 F | WEIGHT: 154.32 LBS | SYSTOLIC BLOOD PRESSURE: 149 MMHG

## 2025-01-07 DIAGNOSIS — Z80.9 FAMILY HISTORY OF MALIGNANT NEOPLASM, UNSPECIFIED: ICD-10-CM

## 2025-01-07 DIAGNOSIS — C78.00 SECONDARY MALIGNANT NEOPLASM OF UNSPECIFIED LUNG: ICD-10-CM

## 2025-01-07 DIAGNOSIS — C7A.090 MALIGNANT CARCINOID TUMOR OF THE BRONCHUS AND LUNG: ICD-10-CM

## 2025-01-07 PROCEDURE — G2211 COMPLEX E/M VISIT ADD ON: CPT

## 2025-01-07 PROCEDURE — 99214 OFFICE O/P EST MOD 30 MIN: CPT

## 2025-01-08 DIAGNOSIS — Z51.89 ENCOUNTER FOR OTHER SPECIFIED AFTERCARE: ICD-10-CM

## 2025-01-30 ENCOUNTER — APPOINTMENT (OUTPATIENT)
Dept: GERIATRICS | Facility: CLINIC | Age: 75
End: 2025-01-30
Payer: MEDICARE

## 2025-01-30 ENCOUNTER — NON-APPOINTMENT (OUTPATIENT)
Age: 75
End: 2025-01-30

## 2025-01-30 VITALS
BODY MASS INDEX: 30.63 KG/M2 | SYSTOLIC BLOOD PRESSURE: 138 MMHG | OXYGEN SATURATION: 99 % | HEART RATE: 72 BPM | WEIGHT: 152.12 LBS | TEMPERATURE: 97 F | RESPIRATION RATE: 16 BRPM | DIASTOLIC BLOOD PRESSURE: 77 MMHG

## 2025-01-30 DIAGNOSIS — C7A.090 MALIGNANT CARCINOID TUMOR OF THE BRONCHUS AND LUNG: ICD-10-CM

## 2025-01-30 DIAGNOSIS — M25.50 PAIN IN UNSPECIFIED JOINT: ICD-10-CM

## 2025-01-30 DIAGNOSIS — E11.49 TYPE 2 DIABETES MELLITUS WITH OTHER DIABETIC NEUROLOGICAL COMPLICATION: ICD-10-CM

## 2025-01-30 DIAGNOSIS — Z51.5 ENCOUNTER FOR PALLIATIVE CARE: ICD-10-CM

## 2025-01-30 DIAGNOSIS — Z99.2 END STAGE RENAL DISEASE: ICD-10-CM

## 2025-01-30 DIAGNOSIS — N18.6 END STAGE RENAL DISEASE: ICD-10-CM

## 2025-01-30 PROCEDURE — 99205 OFFICE O/P NEW HI 60 MIN: CPT

## 2025-01-30 RX ORDER — GABAPENTIN 100 MG/1
100 CAPSULE ORAL
Qty: 120 | Refills: 2 | Status: ACTIVE | COMMUNITY

## 2025-01-30 RX ORDER — EPOETIN ALFA 20000 [IU]/ML
SOLUTION INTRAVENOUS; SUBCUTANEOUS
Refills: 0 | Status: ACTIVE | COMMUNITY

## 2025-01-30 RX ORDER — INSULIN LISPRO 100 U/ML
100 INJECTION, SOLUTION SUBCUTANEOUS
Refills: 0 | Status: ACTIVE | COMMUNITY

## 2025-01-30 RX ORDER — CINACALCET HYDROCHLORIDE 30 MG/1
30 TABLET, COATED ORAL
Refills: 0 | Status: ACTIVE | COMMUNITY

## 2025-01-30 RX ORDER — INSULIN GLARGINE 100 [IU]/ML
100 INJECTION, SOLUTION SUBCUTANEOUS
Refills: 0 | Status: ACTIVE | COMMUNITY

## 2025-02-04 ENCOUNTER — APPOINTMENT (OUTPATIENT)
Dept: INFUSION THERAPY | Facility: HOSPITAL | Age: 75
End: 2025-02-04

## 2025-02-28 ENCOUNTER — OUTPATIENT (OUTPATIENT)
Dept: OUTPATIENT SERVICES | Facility: HOSPITAL | Age: 75
LOS: 1 days | Discharge: HOME | End: 2025-02-28
Payer: MEDICARE

## 2025-02-28 DIAGNOSIS — I77.0 ARTERIOVENOUS FISTULA, ACQUIRED: Chronic | ICD-10-CM

## 2025-02-28 DIAGNOSIS — Z95.0 PRESENCE OF CARDIAC PACEMAKER: Chronic | ICD-10-CM

## 2025-02-28 DIAGNOSIS — L98.8 OTHER SPECIFIED DISORDERS OF THE SKIN AND SUBCUTANEOUS TISSUE: Chronic | ICD-10-CM

## 2025-02-28 DIAGNOSIS — D64.9 ANEMIA, UNSPECIFIED: ICD-10-CM

## 2025-03-04 ENCOUNTER — APPOINTMENT (OUTPATIENT)
Dept: INFUSION THERAPY | Facility: HOSPITAL | Age: 75
End: 2025-03-04

## 2025-03-04 ENCOUNTER — RESULT REVIEW (OUTPATIENT)
Age: 75
End: 2025-03-04

## 2025-03-04 ENCOUNTER — APPOINTMENT (OUTPATIENT)
Dept: HEMATOLOGY ONCOLOGY | Facility: CLINIC | Age: 75
End: 2025-03-04
Payer: MEDICARE

## 2025-03-04 VITALS
DIASTOLIC BLOOD PRESSURE: 75 MMHG | SYSTOLIC BLOOD PRESSURE: 155 MMHG | BODY MASS INDEX: 31.56 KG/M2 | OXYGEN SATURATION: 98 % | HEIGHT: 59 IN | TEMPERATURE: 97.2 F | HEART RATE: 71 BPM | RESPIRATION RATE: 16 BRPM | WEIGHT: 156.53 LBS

## 2025-03-04 DIAGNOSIS — C78.00 SECONDARY MALIGNANT NEOPLASM OF UNSPECIFIED LUNG: ICD-10-CM

## 2025-03-04 LAB
BASOPHILS # BLD AUTO: 0.05 K/UL — SIGNIFICANT CHANGE UP (ref 0–0.2)
BASOPHILS NFR BLD AUTO: 0.8 % — SIGNIFICANT CHANGE UP (ref 0–2)
EOSINOPHIL # BLD AUTO: 0.21 K/UL — SIGNIFICANT CHANGE UP (ref 0–0.5)
EOSINOPHIL NFR BLD AUTO: 3.5 % — SIGNIFICANT CHANGE UP (ref 0–6)
HCT VFR BLD CALC: 33.1 % — LOW (ref 34.5–45)
HGB BLD-MCNC: 10.8 G/DL — LOW (ref 11.5–15.5)
IMM GRANULOCYTES NFR BLD AUTO: 0.5 % — SIGNIFICANT CHANGE UP (ref 0–0.9)
LYMPHOCYTES # BLD AUTO: 1.09 K/UL — SIGNIFICANT CHANGE UP (ref 1–3.3)
LYMPHOCYTES # BLD AUTO: 18.2 % — SIGNIFICANT CHANGE UP (ref 13–44)
MCHC RBC-ENTMCNC: 30 PG — SIGNIFICANT CHANGE UP (ref 27–34)
MCHC RBC-ENTMCNC: 32.6 G/DL — SIGNIFICANT CHANGE UP (ref 32–36)
MCV RBC AUTO: 91.9 FL — SIGNIFICANT CHANGE UP (ref 80–100)
MONOCYTES # BLD AUTO: 0.38 K/UL — SIGNIFICANT CHANGE UP (ref 0–0.9)
MONOCYTES NFR BLD AUTO: 6.4 % — SIGNIFICANT CHANGE UP (ref 2–14)
NEUTROPHILS # BLD AUTO: 4.22 K/UL — SIGNIFICANT CHANGE UP (ref 1.8–7.4)
NEUTROPHILS NFR BLD AUTO: 70.6 % — SIGNIFICANT CHANGE UP (ref 43–77)
NRBC BLD AUTO-RTO: 0 /100 WBCS — SIGNIFICANT CHANGE UP (ref 0–0)
PLATELET # BLD AUTO: 128 K/UL — LOW (ref 150–400)
RBC # BLD: 3.6 M/UL — LOW (ref 3.8–5.2)
RBC # FLD: 13.1 % — SIGNIFICANT CHANGE UP (ref 10.3–14.5)
WBC # BLD: 5.98 K/UL — SIGNIFICANT CHANGE UP (ref 3.8–10.5)
WBC # FLD AUTO: 5.98 K/UL — SIGNIFICANT CHANGE UP (ref 3.8–10.5)

## 2025-03-04 PROCEDURE — 99213 OFFICE O/P EST LOW 20 MIN: CPT

## 2025-03-05 LAB
ALBUMIN SERPL ELPH-MCNC: 4.1 G/DL
ALP BLD-CCNC: 228 U/L
ALT SERPL-CCNC: 13 U/L
ANION GAP SERPL CALC-SCNC: 17 MMOL/L
AST SERPL-CCNC: 19 U/L
BILIRUB SERPL-MCNC: 0.4 MG/DL
BUN SERPL-MCNC: 26 MG/DL
CALCIUM SERPL-MCNC: 8.9 MG/DL
CHLORIDE SERPL-SCNC: 97 MMOL/L
CO2 SERPL-SCNC: 25 MMOL/L
CREAT SERPL-MCNC: 6.46 MG/DL
EGFRCR SERPLBLD CKD-EPI 2021: 6 ML/MIN/1.73M2
GLUCOSE SERPL-MCNC: 213 MG/DL
POTASSIUM SERPL-SCNC: 4.9 MMOL/L
PROT SERPL-MCNC: 7.1 G/DL
SODIUM SERPL-SCNC: 139 MMOL/L

## 2025-03-07 ENCOUNTER — APPOINTMENT (OUTPATIENT)
Dept: CARDIOLOGY | Facility: CLINIC | Age: 75
End: 2025-03-07
Payer: MEDICARE

## 2025-03-07 VITALS
SYSTOLIC BLOOD PRESSURE: 162 MMHG | DIASTOLIC BLOOD PRESSURE: 75 MMHG | HEART RATE: 69 BPM | OXYGEN SATURATION: 95 % | TEMPERATURE: 97.3 F | HEIGHT: 59 IN | WEIGHT: 156.53 LBS | BODY MASS INDEX: 31.56 KG/M2

## 2025-03-07 DIAGNOSIS — N18.6 END STAGE RENAL DISEASE: ICD-10-CM

## 2025-03-07 DIAGNOSIS — C7A.090 MALIGNANT CARCINOID TUMOR OF THE BRONCHUS AND LUNG: ICD-10-CM

## 2025-03-07 DIAGNOSIS — I10 ESSENTIAL (PRIMARY) HYPERTENSION: ICD-10-CM

## 2025-03-07 DIAGNOSIS — Z99.2 END STAGE RENAL DISEASE: ICD-10-CM

## 2025-03-07 DIAGNOSIS — I70.90 UNSPECIFIED ATHEROSCLEROSIS: ICD-10-CM

## 2025-03-07 LAB — CGA SERPL-MCNC: 221.7 NG/ML

## 2025-03-07 PROCEDURE — 99214 OFFICE O/P EST MOD 30 MIN: CPT

## 2025-03-07 PROCEDURE — 93000 ELECTROCARDIOGRAM COMPLETE: CPT

## 2025-03-07 PROCEDURE — G2211 COMPLEX E/M VISIT ADD ON: CPT

## 2025-03-07 PROCEDURE — 93010 ELECTROCARDIOGRAM REPORT: CPT

## 2025-04-01 ENCOUNTER — APPOINTMENT (OUTPATIENT)
Dept: INFUSION THERAPY | Facility: HOSPITAL | Age: 75
End: 2025-04-01

## 2025-04-01 ENCOUNTER — APPOINTMENT (OUTPATIENT)
Dept: HEMATOLOGY ONCOLOGY | Facility: CLINIC | Age: 75
End: 2025-04-01
Payer: MEDICARE

## 2025-04-01 VITALS
WEIGHT: 156.53 LBS | SYSTOLIC BLOOD PRESSURE: 135 MMHG | DIASTOLIC BLOOD PRESSURE: 68 MMHG | RESPIRATION RATE: 16 BRPM | OXYGEN SATURATION: 97 % | HEART RATE: 61 BPM | TEMPERATURE: 97 F | BODY MASS INDEX: 31.62 KG/M2

## 2025-04-01 DIAGNOSIS — C78.00 SECONDARY MALIGNANT NEOPLASM OF UNSPECIFIED LUNG: ICD-10-CM

## 2025-04-01 DIAGNOSIS — C7A.090 MALIGNANT CARCINOID TUMOR OF THE BRONCHUS AND LUNG: ICD-10-CM

## 2025-04-01 PROCEDURE — 99214 OFFICE O/P EST MOD 30 MIN: CPT

## 2025-04-01 PROCEDURE — G2211 COMPLEX E/M VISIT ADD ON: CPT

## 2025-04-29 ENCOUNTER — APPOINTMENT (OUTPATIENT)
Dept: INFUSION THERAPY | Facility: HOSPITAL | Age: 75
End: 2025-04-29

## 2025-04-29 ENCOUNTER — APPOINTMENT (OUTPATIENT)
Dept: HEMATOLOGY ONCOLOGY | Facility: CLINIC | Age: 75
End: 2025-04-29
Payer: MEDICARE

## 2025-04-29 VITALS
SYSTOLIC BLOOD PRESSURE: 158 MMHG | RESPIRATION RATE: 16 BRPM | DIASTOLIC BLOOD PRESSURE: 65 MMHG | TEMPERATURE: 97 F | HEART RATE: 66 BPM | HEIGHT: 59 IN | BODY MASS INDEX: 32 KG/M2 | WEIGHT: 158.73 LBS | OXYGEN SATURATION: 99 %

## 2025-04-29 DIAGNOSIS — C78.00 SECONDARY MALIGNANT NEOPLASM OF UNSPECIFIED LUNG: ICD-10-CM

## 2025-04-29 DIAGNOSIS — N18.6 END STAGE RENAL DISEASE: ICD-10-CM

## 2025-04-29 DIAGNOSIS — C7A.090 MALIGNANT CARCINOID TUMOR OF THE BRONCHUS AND LUNG: ICD-10-CM

## 2025-04-29 DIAGNOSIS — Z99.2 END STAGE RENAL DISEASE: ICD-10-CM

## 2025-04-29 DIAGNOSIS — M25.50 PAIN IN UNSPECIFIED JOINT: ICD-10-CM

## 2025-04-29 DIAGNOSIS — E11.49 TYPE 2 DIABETES MELLITUS WITH OTHER DIABETIC NEUROLOGICAL COMPLICATION: ICD-10-CM

## 2025-04-29 DIAGNOSIS — R06.02 SHORTNESS OF BREATH: ICD-10-CM

## 2025-04-29 PROCEDURE — 99214 OFFICE O/P EST MOD 30 MIN: CPT

## 2025-05-20 ENCOUNTER — OUTPATIENT (OUTPATIENT)
Dept: OUTPATIENT SERVICES | Facility: HOSPITAL | Age: 75
LOS: 1 days | End: 2025-05-20
Payer: MEDICARE

## 2025-05-20 ENCOUNTER — APPOINTMENT (OUTPATIENT)
Dept: CT IMAGING | Facility: IMAGING CENTER | Age: 75
End: 2025-05-20

## 2025-05-20 DIAGNOSIS — Z95.0 PRESENCE OF CARDIAC PACEMAKER: Chronic | ICD-10-CM

## 2025-05-20 DIAGNOSIS — I77.0 ARTERIOVENOUS FISTULA, ACQUIRED: Chronic | ICD-10-CM

## 2025-05-20 DIAGNOSIS — L98.8 OTHER SPECIFIED DISORDERS OF THE SKIN AND SUBCUTANEOUS TISSUE: Chronic | ICD-10-CM

## 2025-05-20 PROCEDURE — 71250 CT THORAX DX C-: CPT

## 2025-05-20 PROCEDURE — 71250 CT THORAX DX C-: CPT | Mod: 26

## 2025-05-21 ENCOUNTER — OUTPATIENT (OUTPATIENT)
Dept: OUTPATIENT SERVICES | Facility: HOSPITAL | Age: 75
LOS: 1 days | Discharge: HOME | End: 2025-05-21

## 2025-05-21 DIAGNOSIS — Z95.0 PRESENCE OF CARDIAC PACEMAKER: Chronic | ICD-10-CM

## 2025-05-21 DIAGNOSIS — L98.8 OTHER SPECIFIED DISORDERS OF THE SKIN AND SUBCUTANEOUS TISSUE: Chronic | ICD-10-CM

## 2025-05-21 DIAGNOSIS — I77.0 ARTERIOVENOUS FISTULA, ACQUIRED: Chronic | ICD-10-CM

## 2025-05-21 DIAGNOSIS — D64.9 ANEMIA, UNSPECIFIED: ICD-10-CM

## 2025-05-27 ENCOUNTER — RESULT REVIEW (OUTPATIENT)
Age: 75
End: 2025-05-27

## 2025-05-27 ENCOUNTER — APPOINTMENT (OUTPATIENT)
Dept: INFUSION THERAPY | Facility: HOSPITAL | Age: 75
End: 2025-05-27

## 2025-05-27 ENCOUNTER — APPOINTMENT (OUTPATIENT)
Dept: HEMATOLOGY ONCOLOGY | Facility: CLINIC | Age: 75
End: 2025-05-27
Payer: MEDICARE

## 2025-05-27 VITALS
WEIGHT: 154.32 LBS | RESPIRATION RATE: 17 BRPM | DIASTOLIC BLOOD PRESSURE: 66 MMHG | HEART RATE: 59 BPM | TEMPERATURE: 98 F | SYSTOLIC BLOOD PRESSURE: 124 MMHG | OXYGEN SATURATION: 98 % | BODY MASS INDEX: 31.17 KG/M2

## 2025-05-27 DIAGNOSIS — C78.00 SECONDARY MALIGNANT NEOPLASM OF UNSPECIFIED LUNG: ICD-10-CM

## 2025-05-27 DIAGNOSIS — C7A.090 MALIGNANT CARCINOID TUMOR OF THE BRONCHUS AND LUNG: ICD-10-CM

## 2025-05-27 LAB
BASOPHILS # BLD AUTO: 0.02 K/UL — SIGNIFICANT CHANGE UP (ref 0–0.2)
BASOPHILS NFR BLD AUTO: 0.3 % — SIGNIFICANT CHANGE UP (ref 0–2)
EOSINOPHIL # BLD AUTO: 0.21 K/UL — SIGNIFICANT CHANGE UP (ref 0–0.5)
EOSINOPHIL NFR BLD AUTO: 3.6 % — SIGNIFICANT CHANGE UP (ref 0–6)
HCT VFR BLD CALC: 34.1 % — LOW (ref 34.5–45)
HGB BLD-MCNC: 10.9 G/DL — LOW (ref 11.5–15.5)
IMM GRANULOCYTES NFR BLD AUTO: 0.5 % — SIGNIFICANT CHANGE UP (ref 0–0.9)
LYMPHOCYTES # BLD AUTO: 1.24 K/UL — SIGNIFICANT CHANGE UP (ref 1–3.3)
LYMPHOCYTES # BLD AUTO: 21.2 % — SIGNIFICANT CHANGE UP (ref 13–44)
MCHC RBC-ENTMCNC: 29.9 PG — SIGNIFICANT CHANGE UP (ref 27–34)
MCHC RBC-ENTMCNC: 32 G/DL — SIGNIFICANT CHANGE UP (ref 32–36)
MCV RBC AUTO: 93.4 FL — SIGNIFICANT CHANGE UP (ref 80–100)
MONOCYTES # BLD AUTO: 0.52 K/UL — SIGNIFICANT CHANGE UP (ref 0–0.9)
MONOCYTES NFR BLD AUTO: 8.9 % — SIGNIFICANT CHANGE UP (ref 2–14)
NEUTROPHILS # BLD AUTO: 3.84 K/UL — SIGNIFICANT CHANGE UP (ref 1.8–7.4)
NEUTROPHILS NFR BLD AUTO: 65.5 % — SIGNIFICANT CHANGE UP (ref 43–77)
NRBC BLD AUTO-RTO: 0 /100 WBCS — SIGNIFICANT CHANGE UP (ref 0–0)
PLATELET # BLD AUTO: 130 K/UL — LOW (ref 150–400)
RBC # BLD: 3.65 M/UL — LOW (ref 3.8–5.2)
RBC # FLD: 14.6 % — HIGH (ref 10.3–14.5)
WBC # BLD: 5.86 K/UL — SIGNIFICANT CHANGE UP (ref 3.8–10.5)
WBC # FLD AUTO: 5.86 K/UL — SIGNIFICANT CHANGE UP (ref 3.8–10.5)

## 2025-05-27 PROCEDURE — 99214 OFFICE O/P EST MOD 30 MIN: CPT

## 2025-05-27 PROCEDURE — G2211 COMPLEX E/M VISIT ADD ON: CPT

## 2025-05-28 ENCOUNTER — NON-APPOINTMENT (OUTPATIENT)
Age: 75
End: 2025-05-28

## 2025-05-28 DIAGNOSIS — C7A.090 MALIGNANT CARCINOID TUMOR OF THE BRONCHUS AND LUNG: ICD-10-CM

## 2025-05-28 LAB
ALBUMIN SERPL ELPH-MCNC: 4.4 G/DL
ALP BLD-CCNC: 183 U/L
ALT SERPL-CCNC: 26 U/L
ANION GAP SERPL CALC-SCNC: 19 MMOL/L
AST SERPL-CCNC: 29 U/L
BILIRUB SERPL-MCNC: 0.4 MG/DL
BUN SERPL-MCNC: 32 MG/DL
CALCIUM SERPL-MCNC: 8.7 MG/DL
CHLORIDE SERPL-SCNC: 94 MMOL/L
CO2 SERPL-SCNC: 24 MMOL/L
CREAT SERPL-MCNC: 6.22 MG/DL
EGFRCR SERPLBLD CKD-EPI 2021: 7 ML/MIN/1.73M2
GLUCOSE SERPL-MCNC: 178 MG/DL
POTASSIUM SERPL-SCNC: 4.8 MMOL/L
PROT SERPL-MCNC: 7.3 G/DL
SODIUM SERPL-SCNC: 137 MMOL/L

## 2025-05-29 LAB — CGA SERPL-MCNC: 193.9 NG/ML

## 2025-06-24 ENCOUNTER — APPOINTMENT (OUTPATIENT)
Dept: HEMATOLOGY ONCOLOGY | Facility: CLINIC | Age: 75
End: 2025-06-24
Payer: MEDICARE

## 2025-06-24 ENCOUNTER — APPOINTMENT (OUTPATIENT)
Dept: INFUSION THERAPY | Facility: HOSPITAL | Age: 75
End: 2025-06-24

## 2025-06-24 VITALS
BODY MASS INDEX: 31.41 KG/M2 | HEART RATE: 60 BPM | TEMPERATURE: 98 F | SYSTOLIC BLOOD PRESSURE: 101 MMHG | WEIGHT: 155.53 LBS | DIASTOLIC BLOOD PRESSURE: 62 MMHG | OXYGEN SATURATION: 97 % | RESPIRATION RATE: 18 BRPM

## 2025-06-24 PROCEDURE — G2211 COMPLEX E/M VISIT ADD ON: CPT

## 2025-06-24 PROCEDURE — 99214 OFFICE O/P EST MOD 30 MIN: CPT

## 2025-07-18 ENCOUNTER — OUTPATIENT (OUTPATIENT)
Dept: OUTPATIENT SERVICES | Facility: HOSPITAL | Age: 75
LOS: 1 days | Discharge: HOME | End: 2025-07-18

## 2025-07-18 DIAGNOSIS — Z95.0 PRESENCE OF CARDIAC PACEMAKER: Chronic | ICD-10-CM

## 2025-07-18 DIAGNOSIS — I77.0 ARTERIOVENOUS FISTULA, ACQUIRED: Chronic | ICD-10-CM

## 2025-07-18 DIAGNOSIS — L98.8 OTHER SPECIFIED DISORDERS OF THE SKIN AND SUBCUTANEOUS TISSUE: Chronic | ICD-10-CM

## 2025-07-18 DIAGNOSIS — D64.9 ANEMIA, UNSPECIFIED: ICD-10-CM

## 2025-07-22 ENCOUNTER — APPOINTMENT (OUTPATIENT)
Dept: INFUSION THERAPY | Facility: HOSPITAL | Age: 75
End: 2025-07-22

## 2025-07-22 ENCOUNTER — APPOINTMENT (OUTPATIENT)
Dept: HEMATOLOGY ONCOLOGY | Facility: CLINIC | Age: 75
End: 2025-07-22
Payer: MEDICARE

## 2025-07-22 VITALS
SYSTOLIC BLOOD PRESSURE: 144 MMHG | WEIGHT: 156.53 LBS | TEMPERATURE: 98 F | RESPIRATION RATE: 17 BRPM | DIASTOLIC BLOOD PRESSURE: 73 MMHG | HEART RATE: 64 BPM | OXYGEN SATURATION: 97 % | BODY MASS INDEX: 31.62 KG/M2

## 2025-07-22 DIAGNOSIS — C78.00 SECONDARY MALIGNANT NEOPLASM OF UNSPECIFIED LUNG: ICD-10-CM

## 2025-07-22 DIAGNOSIS — C7A.090 MALIGNANT CARCINOID TUMOR OF THE BRONCHUS AND LUNG: ICD-10-CM

## 2025-07-22 DIAGNOSIS — N18.6 END STAGE RENAL DISEASE: ICD-10-CM

## 2025-07-22 DIAGNOSIS — Z99.2 END STAGE RENAL DISEASE: ICD-10-CM

## 2025-07-22 PROCEDURE — 99213 OFFICE O/P EST LOW 20 MIN: CPT

## 2025-08-19 ENCOUNTER — APPOINTMENT (OUTPATIENT)
Dept: HEMATOLOGY ONCOLOGY | Facility: CLINIC | Age: 75
End: 2025-08-19
Payer: MEDICARE

## 2025-08-19 ENCOUNTER — APPOINTMENT (OUTPATIENT)
Dept: INFUSION THERAPY | Facility: HOSPITAL | Age: 75
End: 2025-08-19

## 2025-08-19 VITALS
WEIGHT: 154.98 LBS | TEMPERATURE: 98 F | BODY MASS INDEX: 31.3 KG/M2 | DIASTOLIC BLOOD PRESSURE: 64 MMHG | OXYGEN SATURATION: 99 % | HEART RATE: 62 BPM | SYSTOLIC BLOOD PRESSURE: 112 MMHG | RESPIRATION RATE: 17 BRPM

## 2025-08-19 DIAGNOSIS — C7A.090 MALIGNANT CARCINOID TUMOR OF THE BRONCHUS AND LUNG: ICD-10-CM

## 2025-08-19 DIAGNOSIS — C78.00 SECONDARY MALIGNANT NEOPLASM OF UNSPECIFIED LUNG: ICD-10-CM

## 2025-08-19 PROCEDURE — G2211 COMPLEX E/M VISIT ADD ON: CPT

## 2025-08-19 PROCEDURE — 99214 OFFICE O/P EST MOD 30 MIN: CPT

## 2025-09-12 ENCOUNTER — APPOINTMENT (OUTPATIENT)
Dept: CARDIOLOGY | Facility: CLINIC | Age: 75
End: 2025-09-12
Payer: MEDICARE

## 2025-09-12 VITALS
HEIGHT: 59 IN | HEART RATE: 61 BPM | SYSTOLIC BLOOD PRESSURE: 170 MMHG | OXYGEN SATURATION: 98 % | DIASTOLIC BLOOD PRESSURE: 70 MMHG | BODY MASS INDEX: 31.56 KG/M2 | TEMPERATURE: 97.4 F | WEIGHT: 156.53 LBS

## 2025-09-12 VITALS — DIASTOLIC BLOOD PRESSURE: 69 MMHG | SYSTOLIC BLOOD PRESSURE: 156 MMHG

## 2025-09-12 DIAGNOSIS — I63.9 CEREBRAL INFARCTION, UNSPECIFIED: ICD-10-CM

## 2025-09-12 DIAGNOSIS — R07.9 CHEST PAIN, UNSPECIFIED: ICD-10-CM

## 2025-09-12 DIAGNOSIS — N18.6 END STAGE RENAL DISEASE: ICD-10-CM

## 2025-09-12 DIAGNOSIS — Z99.2 END STAGE RENAL DISEASE: ICD-10-CM

## 2025-09-12 DIAGNOSIS — I70.90 UNSPECIFIED ATHEROSCLEROSIS: ICD-10-CM

## 2025-09-12 DIAGNOSIS — I10 ESSENTIAL (PRIMARY) HYPERTENSION: ICD-10-CM

## 2025-09-12 DIAGNOSIS — E11.49 TYPE 2 DIABETES MELLITUS WITH OTHER DIABETIC NEUROLOGICAL COMPLICATION: ICD-10-CM

## 2025-09-12 DIAGNOSIS — C7A.090 MALIGNANT CARCINOID TUMOR OF THE BRONCHUS AND LUNG: ICD-10-CM

## 2025-09-12 PROCEDURE — 99214 OFFICE O/P EST MOD 30 MIN: CPT

## 2025-09-12 PROCEDURE — G2211 COMPLEX E/M VISIT ADD ON: CPT

## 2025-09-12 PROCEDURE — 93000 ELECTROCARDIOGRAM COMPLETE: CPT

## 2025-09-16 ENCOUNTER — APPOINTMENT (OUTPATIENT)
Dept: INFUSION THERAPY | Facility: HOSPITAL | Age: 75
End: 2025-09-16